# Patient Record
Sex: MALE | Race: BLACK OR AFRICAN AMERICAN | Employment: FULL TIME | URBAN - METROPOLITAN AREA
[De-identification: names, ages, dates, MRNs, and addresses within clinical notes are randomized per-mention and may not be internally consistent; named-entity substitution may affect disease eponyms.]

---

## 2017-06-23 PROBLEM — I25.10 CORONARY ARTERY DISEASE INVOLVING NATIVE HEART WITHOUT ANGINA PECTORIS, UNSPECIFIED VESSEL OR LESION TYPE: Status: ACTIVE | Noted: 2017-06-23

## 2017-07-29 ENCOUNTER — HOSPITAL ENCOUNTER (EMERGENCY)
Facility: HOSPITAL | Age: 57
Discharge: HOME | End: 2017-07-29
Attending: EMERGENCY MEDICINE
Payer: COMMERCIAL

## 2017-07-29 ENCOUNTER — APPOINTMENT (OUTPATIENT)
Dept: GENERAL RADIOLOGY | Facility: HOSPITAL | Age: 57
End: 2017-07-29
Attending: EMERGENCY MEDICINE
Payer: COMMERCIAL

## 2017-07-29 VITALS
HEIGHT: 67 IN | TEMPERATURE: 98 F | OXYGEN SATURATION: 100 % | BODY MASS INDEX: 30.61 KG/M2 | DIASTOLIC BLOOD PRESSURE: 72 MMHG | SYSTOLIC BLOOD PRESSURE: 120 MMHG | RESPIRATION RATE: 18 BRPM | WEIGHT: 195 LBS | HEART RATE: 58 BPM

## 2017-07-29 DIAGNOSIS — R07.9 ACUTE CHEST PAIN: ICD-10-CM

## 2017-07-29 DIAGNOSIS — N28.9 NEPHROPATHY: Primary | ICD-10-CM

## 2017-07-29 LAB
ANION GAP SERPL CALC-SCNC: 7 MMOL/L (ref 0–18)
BASOPHILS # BLD: 0 K/UL (ref 0–0.2)
BASOPHILS NFR BLD: 1 %
BUN SERPL-MCNC: 34 MG/DL (ref 8–20)
BUN/CREAT SERPL: 15.3 (ref 10–20)
CALCIUM SERPL-MCNC: 9.3 MG/DL (ref 8.5–10.5)
CHLORIDE SERPL-SCNC: 107 MMOL/L (ref 95–110)
CO2 SERPL-SCNC: 23 MMOL/L (ref 22–32)
CREAT SERPL-MCNC: 2.22 MG/DL (ref 0.5–1.5)
EOSINOPHIL # BLD: 0.2 K/UL (ref 0–0.7)
EOSINOPHIL NFR BLD: 6 %
ERYTHROCYTE [DISTWIDTH] IN BLOOD BY AUTOMATED COUNT: 13.5 % (ref 11–15)
GLUCOSE SERPL-MCNC: 89 MG/DL (ref 70–99)
HCT VFR BLD AUTO: 41.9 % (ref 41–52)
HGB BLD-MCNC: 14.2 G/DL (ref 13.5–17.5)
LYMPHOCYTES # BLD: 1 K/UL (ref 1–4)
LYMPHOCYTES NFR BLD: 24 %
MCH RBC QN AUTO: 31.6 PG (ref 27–32)
MCHC RBC AUTO-ENTMCNC: 34 G/DL (ref 32–37)
MCV RBC AUTO: 92.9 FL (ref 80–100)
MONOCYTES # BLD: 0.6 K/UL (ref 0–1)
MONOCYTES NFR BLD: 15 %
NEUTROPHILS # BLD AUTO: 2.2 K/UL (ref 1.8–7.7)
NEUTROPHILS NFR BLD: 55 %
OSMOLALITY UR CALC.SUM OF ELEC: 291 MOSM/KG (ref 275–295)
PLATELET # BLD AUTO: 137 K/UL (ref 140–400)
PMV BLD AUTO: 10.6 FL (ref 7.4–10.3)
POTASSIUM SERPL-SCNC: 4.6 MMOL/L (ref 3.3–5.1)
RBC # BLD AUTO: 4.51 M/UL (ref 4.5–5.9)
SODIUM SERPL-SCNC: 137 MMOL/L (ref 136–144)
TROPONIN I SERPL-MCNC: 0.01 NG/ML (ref ?–0.03)
TROPONIN I SERPL-MCNC: 0.01 NG/ML (ref ?–0.03)
WBC # BLD AUTO: 4.1 K/UL (ref 4–11)

## 2017-07-29 PROCEDURE — 84484 ASSAY OF TROPONIN QUANT: CPT | Performed by: EMERGENCY MEDICINE

## 2017-07-29 PROCEDURE — 93010 ELECTROCARDIOGRAM REPORT: CPT | Performed by: EMERGENCY MEDICINE

## 2017-07-29 PROCEDURE — 80048 BASIC METABOLIC PNL TOTAL CA: CPT | Performed by: EMERGENCY MEDICINE

## 2017-07-29 PROCEDURE — 36415 COLL VENOUS BLD VENIPUNCTURE: CPT

## 2017-07-29 PROCEDURE — 93005 ELECTROCARDIOGRAM TRACING: CPT

## 2017-07-29 PROCEDURE — 99285 EMERGENCY DEPT VISIT HI MDM: CPT

## 2017-07-29 PROCEDURE — 71020 XR CHEST PA + LAT CHEST (CPT=71020): CPT | Performed by: EMERGENCY MEDICINE

## 2017-07-29 PROCEDURE — 85025 COMPLETE CBC W/AUTO DIFF WBC: CPT | Performed by: EMERGENCY MEDICINE

## 2017-07-30 NOTE — ED PROVIDER NOTES
Patient Seen in: [facility] Emergency Department    History   Patient presents with:  Chest Pain Angina (cardiovascular)      HPI    The patient presents to the ED complaining of right-sided chest pain that started this morning after lifting some chills and fever. HENT: Negative for congestion and sore throat. Eyes: Negative for pain and visual disturbance. Respiratory: Negative for cough and shortness of breath. Cardiovascular: Positive for chest pain. Negative for palpitations.    Viki Gar dry. No rash noted. Nursing note and vitals reviewed.       ED Course        Labs Reviewed   BASIC METABOLIC PANEL (8) - Abnormal; Notable for the following:        Result Value    BUN 34 (*)     Creatinine 2.22 (*)     GFR, Non- 31 (*) Ox: 100%, Room air, Normal     Monitor Interpretation:   normal sinus rhythm, sinus bradycardia    Differential Diagnosis/ Diagnostic Considerations: Atypical chest pain, chest wall pain, ACS    Medical Record Review: I personally reviewed available prior pain    Disposition:  Discharge    Follow-up:  Renny Williamson MD  [facility]  [phone]    Schedule an appointment as soon as possible for a visit in 2 days      Your cardiologist    Schedule an appointment as soon as poss

## 2017-08-01 PROCEDURE — 80061 LIPID PANEL: CPT | Performed by: INTERNAL MEDICINE

## 2017-08-01 PROCEDURE — 36415 COLL VENOUS BLD VENIPUNCTURE: CPT | Performed by: INTERNAL MEDICINE

## 2017-08-01 PROCEDURE — 80076 HEPATIC FUNCTION PANEL: CPT | Performed by: INTERNAL MEDICINE

## 2018-10-24 PROBLEM — N18.30 ACUTE RENAL FAILURE SUPERIMPOSED ON STAGE 3 CHRONIC KIDNEY DISEASE (HCC): Status: ACTIVE | Noted: 2018-10-24

## 2018-10-24 PROBLEM — N17.9 ACUTE RENAL FAILURE SUPERIMPOSED ON STAGE 3 CHRONIC KIDNEY DISEASE (HCC): Status: ACTIVE | Noted: 2018-10-24

## 2019-02-12 PROBLEM — N18.4 CKD (CHRONIC KIDNEY DISEASE) STAGE 4, GFR 15-29 ML/MIN (HCC): Status: ACTIVE | Noted: 2019-02-12

## 2019-04-17 ENCOUNTER — TELEPHONE (OUTPATIENT)
Dept: INTERNAL MEDICINE CLINIC | Facility: CLINIC | Age: 59
End: 2019-04-17

## 2019-04-17 RX ORDER — MECLIZINE HCL 12.5 MG/1
12.5 TABLET ORAL 3 TIMES DAILY PRN
Qty: 30 TABLET | Refills: 5 | Status: SHIPPED | OUTPATIENT
Start: 2019-04-17 | End: 2019-12-18

## 2019-04-17 NOTE — TELEPHONE ENCOUNTER
Pt requesting RX renewal for:  Meclizine  Pt uses [facility] as pharmacy  Pt is out of medication  Tasked to [employer]

## 2019-05-01 RX ORDER — ALPRAZOLAM 0.25 MG/1
TABLET ORAL
Qty: 30 TABLET | Refills: 1 | Status: SHIPPED
Start: 2019-05-01 | End: 2020-02-20

## 2019-06-19 ENCOUNTER — TELEPHONE (OUTPATIENT)
Dept: INTERNAL MEDICINE CLINIC | Facility: CLINIC | Age: 59
End: 2019-06-19

## 2019-06-20 RX ORDER — SPIRONOLACTONE 25 MG/1
TABLET ORAL
Qty: 90 TABLET | Refills: 3 | Status: SHIPPED | OUTPATIENT
Start: 2019-06-20 | End: 2020-09-08

## 2019-06-20 NOTE — TELEPHONE ENCOUNTER
To  - pls review spironolactone with renal CrCl (chronic kidney disease stage 4, gfr 15-29 ml/min (hcc)   although pt K+ is WNL      Component      Latest Ref Rng & Units 8/7/2018 2/6/2018 7/29/2017 11/7/2016   CREATININE      0.70 - 1.30 mg/dL 2.74 (H

## 2019-06-20 NOTE — TELEPHONE ENCOUNTER
Labs drawn by nephrologist in February revealed creatinine of 2.2--this has improved.   Therefore okay to continue same medications including spironolactone

## 2019-07-01 ENCOUNTER — TELEPHONE (OUTPATIENT)
Dept: INTERNAL MEDICINE CLINIC | Facility: CLINIC | Age: 59
End: 2019-07-01

## 2019-07-01 RX ORDER — TADALAFIL 20 MG/1
20 TABLET ORAL AS NEEDED
Qty: 8 TABLET | Refills: 0 | Status: SHIPPED | OUTPATIENT
Start: 2019-07-01 | End: 2020-06-25

## 2019-07-01 NOTE — TELEPHONE ENCOUNTER
Pt requesting refill for:   Tadalafil  Pt is out of medication  Pt will use [facility] as pharmacy  Tasked to [employer]

## 2019-08-07 ENCOUNTER — OFFICE VISIT (OUTPATIENT)
Dept: INTERNAL MEDICINE CLINIC | Facility: CLINIC | Age: 59
End: 2019-08-07
Payer: COMMERCIAL

## 2019-08-07 ENCOUNTER — APPOINTMENT (OUTPATIENT)
Dept: LAB | Age: 59
End: 2019-08-07
Attending: INTERNAL MEDICINE
Payer: COMMERCIAL

## 2019-08-07 VITALS
HEIGHT: 67 IN | BODY MASS INDEX: 32.41 KG/M2 | WEIGHT: 206.5 LBS | DIASTOLIC BLOOD PRESSURE: 70 MMHG | HEART RATE: 55 BPM | TEMPERATURE: 98 F | OXYGEN SATURATION: 98 % | SYSTOLIC BLOOD PRESSURE: 110 MMHG

## 2019-08-07 DIAGNOSIS — M25.562 LEFT KNEE PAIN, UNSPECIFIED CHRONICITY: ICD-10-CM

## 2019-08-07 DIAGNOSIS — E87.5 HYPERKALEMIA: ICD-10-CM

## 2019-08-07 DIAGNOSIS — I25.10 CORONARY ARTERY DISEASE INVOLVING NATIVE HEART WITHOUT ANGINA PECTORIS, UNSPECIFIED VESSEL OR LESION TYPE: ICD-10-CM

## 2019-08-07 DIAGNOSIS — I10 ESSENTIAL HYPERTENSION: Primary | ICD-10-CM

## 2019-08-07 DIAGNOSIS — N18.4 CKD (CHRONIC KIDNEY DISEASE) STAGE 4, GFR 15-29 ML/MIN (HCC): ICD-10-CM

## 2019-08-07 DIAGNOSIS — M79.605 LEFT LEG PAIN: ICD-10-CM

## 2019-08-07 DIAGNOSIS — N28.9 NEPHROPATHY: ICD-10-CM

## 2019-08-07 LAB
ANION GAP SERPL CALC-SCNC: 7 MMOL/L (ref 0–18)
BUN BLD-MCNC: 53 MG/DL (ref 7–18)
BUN/CREAT SERPL: 22.4 (ref 10–20)
CALCIUM BLD-MCNC: 10 MG/DL (ref 8.5–10.1)
CHLORIDE SERPL-SCNC: 111 MMOL/L (ref 98–112)
CO2 SERPL-SCNC: 24 MMOL/L (ref 21–32)
CREAT BLD-MCNC: 2.37 MG/DL (ref 0.7–1.3)
GLUCOSE BLD-MCNC: 71 MG/DL (ref 70–99)
OSMOLALITY SERPL CALC.SUM OF ELEC: 307 MOSM/KG (ref 275–295)
PATIENT FASTING: NO
POTASSIUM SERPL-SCNC: 4.8 MMOL/L (ref 3.5–5.1)
SODIUM SERPL-SCNC: 142 MMOL/L (ref 136–145)

## 2019-08-07 PROCEDURE — 99213 OFFICE O/P EST LOW 20 MIN: CPT | Performed by: INTERNAL MEDICINE

## 2019-08-07 PROCEDURE — 80048 BASIC METABOLIC PNL TOTAL CA: CPT

## 2019-08-07 PROCEDURE — 36415 COLL VENOUS BLD VENIPUNCTURE: CPT

## 2019-08-07 NOTE — PROGRESS NOTES
HPI:    Patient ID: Abril Radford is a 63year old male. Patient presents for follow-up. He is noting worsening left knee and left calf pain. He denies any specific injury or trauma. At times, he feels as if it may give out.   The pain is usually worse Take 1 tablet (100 mg total) by mouth daily. Disp: 90 tablet Rfl: 3   Clobetasol Propionate 0.05 % External Ointment Apply twice daily Disp: 60 g Rfl: 3   metoprolol tartrate 12.5 mg Oral Tab Take 12.5 mg by mouth 2x Daily(Beta Blocker).  Disp:  Rfl:    ome recheck BMP today--if potassium remains elevated, patient may need further medication adjustment. He does have appointment with nephrology next month.     Patient is having left knee and leg pain--appears to be more muscular in nature and may have to do wi

## 2019-08-09 ENCOUNTER — TELEPHONE (OUTPATIENT)
Dept: INTERNAL MEDICINE CLINIC | Facility: CLINIC | Age: 59
End: 2019-08-09

## 2019-08-09 NOTE — TELEPHONE ENCOUNTER
----- Message from Jace Ivan MD sent at 8/9/2019  7:38 AM CDT -----  Potassium is now normal at 4.8. Creatinine shows improvement at 2.37. This is now back to baseline. Therefore no interventions needed. Continue same medications.

## 2019-09-15 ENCOUNTER — TELEPHONE (OUTPATIENT)
Dept: INTERNAL MEDICINE CLINIC | Facility: CLINIC | Age: 59
End: 2019-09-15

## 2019-09-16 NOTE — TELEPHONE ENCOUNTER
Refill requests received for losartan and amlodipine-atorvastatin.      High Medication Interaction Alert:  Drug-Drug: spironolactone and losartan   The risk of hyperkalemia may be increased when potassium-sparing diuretics are co-administered with angioten

## 2019-09-17 RX ORDER — AMLODIPINE BESYLATE AND ATORVASTATIN CALCIUM 10; 80 MG/1; MG/1
TABLET, FILM COATED ORAL
Qty: 90 TABLET | Refills: 3 | Status: SHIPPED | OUTPATIENT
Start: 2019-09-17 | End: 2019-09-23 | Stop reason: RX

## 2019-09-17 RX ORDER — LOSARTAN POTASSIUM 100 MG/1
TABLET ORAL
Qty: 90 TABLET | Refills: 3 | Status: SHIPPED | OUTPATIENT
Start: 2019-09-17 | End: 2020-09-10

## 2019-09-23 RX ORDER — AMLODIPINE BESYLATE 10 MG/1
10 TABLET ORAL DAILY
Qty: 90 TABLET | Refills: 3 | Status: SHIPPED | OUTPATIENT
Start: 2019-09-23 | End: 2020-09-18

## 2019-09-23 RX ORDER — ATORVASTATIN CALCIUM 80 MG/1
80 TABLET, FILM COATED ORAL DAILY
Qty: 90 TABLET | Refills: 3 | Status: SHIPPED | OUTPATIENT
Start: 2019-09-23 | End: 2020-09-18

## 2019-09-23 NOTE — TELEPHONE ENCOUNTER
Per 2/12/19   Patient's cardiac status is stable. He is having no angina symptoms. He is exercising regularly and feeling better Because of this. Continue with medical management.     Requested Prescriptions     Signed Prescriptions Disp Refills   • AMLO

## 2019-09-24 NOTE — TELEPHONE ENCOUNTER
[facility] Amlod[facility]ine-At[facility]vast, medication is on backorder  Please consider sending as 2 separate scripts, Amlodipine 10 mg and then another script for Atorvastatin 80 mg  Placed in purple folder   Tasked to rx

## 2019-09-27 RX ORDER — FEBUXOSTAT 80 MG/1
1 TABLET, FILM COATED ORAL
Qty: 90 TABLET | Refills: 3 | Status: SHIPPED | OUTPATIENT
Start: 2019-09-27 | End: 2020-01-07 | Stop reason: DRUGHIGH

## 2019-09-27 NOTE — TELEPHONE ENCOUNTER
Called to request Uloric refill  No refills were avail at pharmacy   -  takes half of an 80 mg tablet daily  Please send prescriptions to [facility]

## 2019-10-11 ENCOUNTER — OFFICE VISIT (OUTPATIENT)
Dept: INTERNAL MEDICINE CLINIC | Facility: CLINIC | Age: 59
End: 2019-10-11
Payer: COMMERCIAL

## 2019-10-11 VITALS
TEMPERATURE: 99 F | SYSTOLIC BLOOD PRESSURE: 130 MMHG | HEIGHT: 67 IN | OXYGEN SATURATION: 98 % | WEIGHT: 206.25 LBS | HEART RATE: 60 BPM | DIASTOLIC BLOOD PRESSURE: 70 MMHG | BODY MASS INDEX: 32.37 KG/M2

## 2019-10-11 DIAGNOSIS — R42 VERTIGO: Primary | ICD-10-CM

## 2019-10-11 DIAGNOSIS — I25.10 CORONARY ARTERY DISEASE INVOLVING NATIVE CORONARY ARTERY OF NATIVE HEART WITHOUT ANGINA PECTORIS: ICD-10-CM

## 2019-10-11 DIAGNOSIS — N28.9 NEPHROPATHY: ICD-10-CM

## 2019-10-11 DIAGNOSIS — I10 ESSENTIAL HYPERTENSION: ICD-10-CM

## 2019-10-11 DIAGNOSIS — E87.5 HYPERKALEMIA: ICD-10-CM

## 2019-10-11 PROCEDURE — 90471 IMMUNIZATION ADMIN: CPT | Performed by: INTERNAL MEDICINE

## 2019-10-11 PROCEDURE — 90686 IIV4 VACC NO PRSV 0.5 ML IM: CPT | Performed by: INTERNAL MEDICINE

## 2019-10-11 PROCEDURE — 99214 OFFICE O/P EST MOD 30 MIN: CPT | Performed by: INTERNAL MEDICINE

## 2019-10-11 NOTE — PROGRESS NOTES
HPI:    Patient ID: Katherine Vega is a 63year old male. Patient presented to [facility] emergency room on October 4 with complaints of dizziness. This was thought to be secondary to his symptoms of previous labyrinthitis/benign positional vertigo.   He was st 12.5 MG Oral Tab Take 1 tablet (12.5 mg total) by mouth 3 (three) times daily as needed.  Disp: 30 tablet Rfl: 5   Clobetasol Propionate 0.05 % External Ointment Apply twice daily Disp: 60 g Rfl: 3   omega-3 fatty acids (FISH OIL) 1000 MG Oral Cap Take 1,00 nephropathy with proteinuria. Therefore will continue same losartan dose.   However will try to decrease spironolactone from patient's current to 25 mg daily down to 12.5 mg daily  We will recheck potassium level again in 2 weeks    Renal function has been

## 2019-10-30 ENCOUNTER — LAB ENCOUNTER (OUTPATIENT)
Dept: LAB | Age: 59
End: 2019-10-30
Attending: INTERNAL MEDICINE
Payer: COMMERCIAL

## 2019-10-30 DIAGNOSIS — I10 ESSENTIAL HYPERTENSION: ICD-10-CM

## 2019-10-30 DIAGNOSIS — N18.30 ACUTE RENAL FAILURE WITH ACUTE TUBULAR NECROSIS SUPERIMPOSED ON STAGE 3 CHRONIC KIDNEY DISEASE (HCC): ICD-10-CM

## 2019-10-30 DIAGNOSIS — N17.0 ACUTE RENAL FAILURE WITH ACUTE TUBULAR NECROSIS SUPERIMPOSED ON STAGE 3 CHRONIC KIDNEY DISEASE (HCC): ICD-10-CM

## 2019-10-30 DIAGNOSIS — E87.5 HYPERKALEMIA: ICD-10-CM

## 2019-10-30 DIAGNOSIS — I25.10 CORONARY ARTERY DISEASE INVOLVING NATIVE HEART WITHOUT ANGINA PECTORIS, UNSPECIFIED VESSEL OR LESION TYPE: ICD-10-CM

## 2019-10-30 PROCEDURE — 80061 LIPID PANEL: CPT

## 2019-10-30 PROCEDURE — 36415 COLL VENOUS BLD VENIPUNCTURE: CPT

## 2019-10-30 PROCEDURE — 80048 BASIC METABOLIC PNL TOTAL CA: CPT

## 2019-10-30 PROCEDURE — 80076 HEPATIC FUNCTION PANEL: CPT

## 2019-10-31 ENCOUNTER — TELEPHONE (OUTPATIENT)
Dept: INTERNAL MEDICINE CLINIC | Facility: CLINIC | Age: 59
End: 2019-10-31

## 2019-10-31 NOTE — TELEPHONE ENCOUNTER
----- Message from Gale Squires MD sent at 10/31/2019  4:48 PM CDT -----  Renal function shows improvement with BUN 34 and creatinine of 2.08. Potassium is now also normal at 4.3.   Therefore continue same medications, including the previously reduced

## 2019-11-01 NOTE — TELEPHONE ENCOUNTER
Pt returning call for lab results (computers were down when he called back) please call home [phone] ok to leave voicemail

## 2019-11-11 ENCOUNTER — TELEPHONE (OUTPATIENT)
Dept: INTERNAL MEDICINE CLINIC | Facility: CLINIC | Age: 59
End: 2019-11-11

## 2019-11-13 NOTE — TELEPHONE ENCOUNTER
Please advise- patient called back , stating DR. ELLISON was prescriber - refill pending- to DR. ELLISON .  Patient wants to know when done

## 2019-12-12 ENCOUNTER — TELEPHONE (OUTPATIENT)
Dept: INTERNAL MEDICINE CLINIC | Facility: CLINIC | Age: 59
End: 2019-12-12

## 2019-12-12 NOTE — TELEPHONE ENCOUNTER
Blood pressure is too high. Patient is currently taking metoprolol 12.5 mg p.o. twice daily  He should increase this to 25 mg p.o. twice daily. Continue with his other antihypertensives.   Patient needs to monitor blood pressure and pulse while on this do

## 2019-12-12 NOTE — TELEPHONE ENCOUNTER
I spoke to Cite Bhavesh Daugherty and relayed Dr Selwyn Mujica message to him. He verbalized understanding. He actually has 25 mg tablets that he normally breaks in half, so he will just take the full tablet twice daily.   He scheduled an appt for next Wednesday at 12:20pm.

## 2019-12-12 NOTE — TELEPHONE ENCOUNTER
Pt asked that Dr Cathy Harvey please call him  Has question about his blood pressure  180/119, pt is concerned this is high  Tasked to nursing

## 2019-12-12 NOTE — TELEPHONE ENCOUNTER
To Dr. Elinor Bennett - see below - BP below taken 20 minutes ago. Yesterday 169/110. Pt had headache yesterday, resolved today. Denies diaphoresis, SOB.      Does c/o pain left shoulder - pt has hx of pinched nerves, seeing chiropractor, saw him yesterday, William Mcpherson

## 2019-12-18 ENCOUNTER — OFFICE VISIT (OUTPATIENT)
Dept: INTERNAL MEDICINE CLINIC | Facility: CLINIC | Age: 59
End: 2019-12-18
Payer: COMMERCIAL

## 2019-12-18 ENCOUNTER — APPOINTMENT (OUTPATIENT)
Dept: LAB | Age: 59
End: 2019-12-18
Attending: INTERNAL MEDICINE
Payer: COMMERCIAL

## 2019-12-18 VITALS
OXYGEN SATURATION: 98 % | SYSTOLIC BLOOD PRESSURE: 126 MMHG | DIASTOLIC BLOOD PRESSURE: 86 MMHG | TEMPERATURE: 98 F | HEART RATE: 56 BPM | BODY MASS INDEX: 32.67 KG/M2 | WEIGHT: 208.19 LBS | HEIGHT: 67 IN

## 2019-12-18 DIAGNOSIS — H83.2X9 VESTIBULAR DISEQUILIBRIUM, UNSPECIFIED LATERALITY: ICD-10-CM

## 2019-12-18 DIAGNOSIS — E87.5 HYPERKALEMIA: ICD-10-CM

## 2019-12-18 DIAGNOSIS — N18.4 CKD (CHRONIC KIDNEY DISEASE) STAGE 4, GFR 15-29 ML/MIN (HCC): ICD-10-CM

## 2019-12-18 DIAGNOSIS — I25.10 CORONARY ARTERY DISEASE INVOLVING NATIVE HEART WITHOUT ANGINA PECTORIS, UNSPECIFIED VESSEL OR LESION TYPE: ICD-10-CM

## 2019-12-18 DIAGNOSIS — I10 ESSENTIAL HYPERTENSION: Primary | ICD-10-CM

## 2019-12-18 PROCEDURE — 99213 OFFICE O/P EST LOW 20 MIN: CPT | Performed by: INTERNAL MEDICINE

## 2019-12-18 PROCEDURE — 80048 BASIC METABOLIC PNL TOTAL CA: CPT

## 2019-12-18 PROCEDURE — 36415 COLL VENOUS BLD VENIPUNCTURE: CPT

## 2019-12-18 RX ORDER — MECLIZINE HYDROCHLORIDE 25 MG/1
25 TABLET ORAL 3 TIMES DAILY PRN
Qty: 90 TABLET | Refills: 3 | Status: SHIPPED | OUTPATIENT
Start: 2019-12-18 | End: 2021-03-22

## 2019-12-18 NOTE — PROGRESS NOTES
HPI:    Patient ID: Betsey Linedr is a 63year old male. Presents for follow-up. Continues to have intermittent dizziness with certain position changes  Takes meclizine and this does help.     Has been monitoring his home blood pressure and noting readin Oral Tab TAKE 1 TABLET BY MOUTH TWICE A DAY AS NEEDED 30 tablet 1   • Clobetasol Propionate 0.05 % External Ointment Apply twice daily 60 g 3   • omega-3 fatty acids (FISH OIL) 1000 MG Oral Cap Take 1,000 mg by mouth daily.      • aspirin 81 MG Oral Tab [facility] Basic Metabolic Panel (8) [E]      Meds This Visit:  Requested Prescriptions     Signed Prescriptions Disp Refills   • Meclizine HCl 25 MG Oral Tab 90 tablet 3     Sig: Take 1 tablet (25 mg total) by mouth 3 (three) times daily as needed.        Imaging & R

## 2019-12-19 ENCOUNTER — TELEPHONE (OUTPATIENT)
Dept: INTERNAL MEDICINE CLINIC | Facility: CLINIC | Age: 59
End: 2019-12-19

## 2019-12-19 NOTE — TELEPHONE ENCOUNTER
----- Message from Tata Balderrama MD sent at 12/19/2019  4:52 PM CST -----  CMP shows improved renal function with creatinine of 1.94.   Potassium also remains normal at 5.1  Continue same medications

## 2020-01-02 ENCOUNTER — HOSPITAL ENCOUNTER (EMERGENCY)
Facility: HOSPITAL | Age: 60
Discharge: HOME | End: 2020-01-02
Attending: EMERGENCY MEDICINE
Payer: COMMERCIAL

## 2020-01-02 ENCOUNTER — TELEPHONE (OUTPATIENT)
Dept: INTERNAL MEDICINE CLINIC | Facility: CLINIC | Age: 60
End: 2020-01-02

## 2020-01-02 VITALS
DIASTOLIC BLOOD PRESSURE: 78 MMHG | OXYGEN SATURATION: 96 % | SYSTOLIC BLOOD PRESSURE: 122 MMHG | WEIGHT: 205 LBS | RESPIRATION RATE: 18 BRPM | HEART RATE: 51 BPM | TEMPERATURE: 99 F | HEIGHT: 67 IN | BODY MASS INDEX: 32.18 KG/M2

## 2020-01-02 DIAGNOSIS — R51.9 HEADACHE DISORDER: ICD-10-CM

## 2020-01-02 DIAGNOSIS — I10 HYPERTENSION, UNSPECIFIED TYPE: Primary | ICD-10-CM

## 2020-01-02 LAB
ANION GAP SERPL CALC-SCNC: 3 MMOL/L (ref 0–18)
BASOPHILS # BLD AUTO: 0.03 X10(3) UL (ref 0–0.2)
BASOPHILS NFR BLD AUTO: 0.6 %
BUN BLD-MCNC: 31 MG/DL (ref 7–18)
BUN/CREAT SERPL: 14.4 (ref 10–20)
CALCIUM BLD-MCNC: 9.1 MG/DL (ref 8.5–10.1)
CHLORIDE SERPL-SCNC: 113 MMOL/L (ref 98–112)
CO2 SERPL-SCNC: 24 MMOL/L (ref 21–32)
CREAT BLD-MCNC: 2.15 MG/DL (ref 0.7–1.3)
DEPRECATED RDW RBC AUTO: 45.3 FL (ref 35.1–46.3)
EOSINOPHIL # BLD AUTO: 0.2 X10(3) UL (ref 0–0.7)
EOSINOPHIL NFR BLD AUTO: 3.8 %
ERYTHROCYTE [DISTWIDTH] IN BLOOD BY AUTOMATED COUNT: 13.2 % (ref 11–15)
GLUCOSE BLD-MCNC: 89 MG/DL (ref 70–99)
HCT VFR BLD AUTO: 45.4 % (ref 39–53)
HGB BLD-MCNC: 14.9 G/DL (ref 13–17.5)
IMM GRANULOCYTES # BLD AUTO: 0.03 X10(3) UL (ref 0–1)
IMM GRANULOCYTES NFR BLD: 0.6 %
LYMPHOCYTES # BLD AUTO: 1.38 X10(3) UL (ref 1–4)
LYMPHOCYTES NFR BLD AUTO: 26.4 %
MCH RBC QN AUTO: 30.9 PG (ref 26–34)
MCHC RBC AUTO-ENTMCNC: 32.8 G/DL (ref 31–37)
MCV RBC AUTO: 94.2 FL (ref 80–100)
MONOCYTES # BLD AUTO: 0.6 X10(3) UL (ref 0.1–1)
MONOCYTES NFR BLD AUTO: 11.5 %
NEUTROPHILS # BLD AUTO: 2.98 X10 (3) UL (ref 1.5–7.7)
NEUTROPHILS # BLD AUTO: 2.98 X10(3) UL (ref 1.5–7.7)
NEUTROPHILS NFR BLD AUTO: 57.1 %
OSMOLALITY SERPL CALC.SUM OF ELEC: 296 MOSM/KG (ref 275–295)
PLATELET # BLD AUTO: 156 10(3)UL (ref 150–450)
POTASSIUM SERPL-SCNC: 4.4 MMOL/L (ref 3.5–5.1)
RBC # BLD AUTO: 4.82 X10(6)UL (ref 4.3–5.7)
SODIUM SERPL-SCNC: 140 MMOL/L (ref 136–145)
TROPONIN I SERPL-MCNC: <0.045 NG/ML (ref ?–0.04)
WBC # BLD AUTO: 5.2 X10(3) UL (ref 4–11)

## 2020-01-02 PROCEDURE — 80048 BASIC METABOLIC PNL TOTAL CA: CPT | Performed by: EMERGENCY MEDICINE

## 2020-01-02 PROCEDURE — 96375 TX/PRO/DX INJ NEW DRUG ADDON: CPT

## 2020-01-02 PROCEDURE — 99284 EMERGENCY DEPT VISIT MOD MDM: CPT

## 2020-01-02 PROCEDURE — 96374 THER/PROPH/DIAG INJ IV PUSH: CPT

## 2020-01-02 PROCEDURE — 93005 ELECTROCARDIOGRAM TRACING: CPT

## 2020-01-02 PROCEDURE — 96361 HYDRATE IV INFUSION ADD-ON: CPT

## 2020-01-02 PROCEDURE — 93010 ELECTROCARDIOGRAM REPORT: CPT | Performed by: EMERGENCY MEDICINE

## 2020-01-02 PROCEDURE — 84484 ASSAY OF TROPONIN QUANT: CPT | Performed by: EMERGENCY MEDICINE

## 2020-01-02 PROCEDURE — 85025 COMPLETE CBC W/AUTO DIFF WBC: CPT | Performed by: EMERGENCY MEDICINE

## 2020-01-02 RX ORDER — DIPHENHYDRAMINE HYDROCHLORIDE 50 MG/ML
25 INJECTION INTRAMUSCULAR; INTRAVENOUS ONCE
Status: DISCONTINUED | OUTPATIENT
Start: 2020-01-02 | End: 2020-01-02

## 2020-01-02 RX ORDER — METOCLOPRAMIDE HYDROCHLORIDE 5 MG/ML
10 INJECTION INTRAMUSCULAR; INTRAVENOUS ONCE
Status: DISCONTINUED | OUTPATIENT
Start: 2020-01-02 | End: 2020-01-02

## 2020-01-02 RX ORDER — ACETAMINOPHEN 325 MG/1
650 TABLET ORAL ONCE
Status: COMPLETED | OUTPATIENT
Start: 2020-01-02 | End: 2020-01-02

## 2020-01-02 NOTE — ED INITIAL ASSESSMENT (HPI)
Pt reports high b/p reading all week.  Pt reports his bp was 210/118 this am. Pt denies cp and reports an occipital HA that radiates in his left eye

## 2020-01-02 NOTE — ED NOTES
Pt refused IV medication for headache. MD notified. Pt requesting tylenol po. Ok per MD verbal order. Pt states headache \"mild\".

## 2020-01-02 NOTE — ED PROVIDER NOTES
Patient Seen in: [facility] Emergency Department      History   Patient presents with:  Hypertension    Stated Complaint:     HPI    31-year-old male with history of CAD, hypertension, recently increased on his metoprolol and compliant with his me nausea and vomiting. Genitourinary: Negative for dysuria, flank pain and frequency. Musculoskeletal: Negative for back pain. Skin: Negative for rash. Neurological: Positive for headaches. Negative for weakness and light-headedness.    All other syst b/l UEs and LEs, normal sensation in all extremities, normal finger to nose b/l, normal gait, no facial asymmetry, normal speech             ED Course     EKG    Rate, intervals and axes as noted on EKG Report.   Rate: 46  Rhythm: Sinus Rhythm  Reading: abn MCV 94.2 80.0 - 100.0 fL    MCH 30.9 26.0 - 34.0 pg    MCHC 32.8 31.0 - 37.0 g/dL    RDW-SD 45.3 35.1 - 46.3 fL    RDW 13.2 11.0 - 15.0 %    .0 150.0 - 450.0 10(3)uL    Neutrophil Absolute Prelim 2.98 1.50 - 7.70 x10 (3) uL    Neutrophil Absolute 2. understanding and agrees to d/c instructions    EMERGENCY DEPARTMENT MEDICAL DECISION MAKING:  After obtaining the patient's history, performing the physical exam and reviewing the diagnostics, multiple initial diagnoses were considered based on the presen

## 2020-01-02 NOTE — TELEPHONE ENCOUNTER
Please call pt at home  BP is high today 210/127, pt medications have been adjusted recently  171/117 yesterday  Tasked to nursing

## 2020-01-02 NOTE — TELEPHONE ENCOUNTER
To Dr. Praneeth Perry - see below - pt states BP has been running high the last week or so. Headaches which are somewhat relieved OTC fever reducer. Denies/ dizziness/SOB. Pt states Left arm is sore, \"like I have been lifting wgts and my arm is sore\".   Onset:

## 2020-01-02 NOTE — TELEPHONE ENCOUNTER
Spoke to patient and relayed MD message. Patient verbalized understanding and agrees with plan. Pt stated he will drive himself to [facility].

## 2020-01-07 ENCOUNTER — OFFICE VISIT (OUTPATIENT)
Dept: INTERNAL MEDICINE CLINIC | Facility: CLINIC | Age: 60
End: 2020-01-07
Payer: COMMERCIAL

## 2020-01-07 VITALS
HEART RATE: 51 BPM | WEIGHT: 210 LBS | SYSTOLIC BLOOD PRESSURE: 124 MMHG | DIASTOLIC BLOOD PRESSURE: 84 MMHG | OXYGEN SATURATION: 99 % | BODY MASS INDEX: 32.96 KG/M2 | HEIGHT: 67 IN | RESPIRATION RATE: 16 BRPM | TEMPERATURE: 98 F

## 2020-01-07 DIAGNOSIS — N18.4 CKD (CHRONIC KIDNEY DISEASE) STAGE 4, GFR 15-29 ML/MIN (HCC): ICD-10-CM

## 2020-01-07 DIAGNOSIS — I10 ESSENTIAL HYPERTENSION: ICD-10-CM

## 2020-01-07 DIAGNOSIS — R51.9 HEADACHE DISORDER: Primary | ICD-10-CM

## 2020-01-07 DIAGNOSIS — I25.10 CORONARY ARTERY DISEASE INVOLVING NATIVE HEART WITHOUT ANGINA PECTORIS, UNSPECIFIED VESSEL OR LESION TYPE: ICD-10-CM

## 2020-01-07 PROCEDURE — 99213 OFFICE O/P EST LOW 20 MIN: CPT | Performed by: INTERNAL MEDICINE

## 2020-01-07 RX ORDER — CLOPIDOGREL BISULFATE 75 MG/1
1 TABLET ORAL DAILY
COMMUNITY
Start: 2016-12-07 | End: 2021-12-21

## 2020-01-07 RX ORDER — ACETAMINOPHEN 500 MG
1000 TABLET ORAL EVERY 6 HOURS PRN
COMMUNITY
End: 2022-02-01

## 2020-01-07 RX ORDER — FEBUXOSTAT 40 MG/1
1 TABLET, FILM COATED ORAL DAILY
COMMUNITY
Start: 2019-02-13 | End: 2020-10-01

## 2020-01-07 NOTE — PROGRESS NOTES
HPI:    Patient ID: Jackson Luna is a 63year old male. Patient presents with complaints of recurrent headaches that initially started approximately 2 to 3 weeks ago. Patient has been taking Tylenol migraine and notes significant improvement with this. daily as needed. 90 tablet 3   • metoprolol Tartrate 25 MG Oral Tab Take 0.5 tablets (12.5 mg total) by mouth 2 (two) times daily. (Patient taking differently: Take 25 mg by mouth 2 (two) times daily.   ) 180 tablet 3   • atorvastatin 80 MG Oral Tab Take 1 distress. He has no wheezes. He has no rales. Musculoskeletal:         General: No edema. Lymphadenopathy:     He has no cervical adenopathy. Neurological: He is alert and oriented to person, place, and time. He has normal reflexes.  No cranial nerve

## 2020-01-15 ENCOUNTER — TELEPHONE (OUTPATIENT)
Dept: INTERNAL MEDICINE CLINIC | Facility: CLINIC | Age: 60
End: 2020-01-15

## 2020-01-15 ENCOUNTER — HOSPITAL ENCOUNTER (OUTPATIENT)
Dept: MRI IMAGING | Facility: HOSPITAL | Age: 60
Discharge: HOME | End: 2020-01-15
Attending: INTERNAL MEDICINE
Payer: COMMERCIAL

## 2020-01-15 DIAGNOSIS — R51.9 HEADACHE DISORDER: ICD-10-CM

## 2020-01-15 PROCEDURE — 70551 MRI BRAIN STEM W/O DYE: CPT | Performed by: INTERNAL MEDICINE

## 2020-01-22 ENCOUNTER — TELEPHONE (OUTPATIENT)
Dept: INTERNAL MEDICINE CLINIC | Facility: CLINIC | Age: 60
End: 2020-01-22

## 2020-01-22 NOTE — TELEPHONE ENCOUNTER
[facility] [place] [facility] RX for new dosage for:  Metoprolol  \"pt states dose has been increased to 1 tablet twice daily\"  Form placed in purple folder  Tasked to [employer]

## 2020-01-24 NOTE — TELEPHONE ENCOUNTER
Pt confirmed dose. He is taking metoprolol 25mg po BID. Requests Rx be sent to [facility] in [place].     Refill request is for a maintenance medication and has met the criteria specified in the Ambulatory Medication Refill Standing Order for [facility]

## 2020-02-20 RX ORDER — ALPRAZOLAM 0.25 MG/1
0.25 TABLET ORAL 2 TIMES DAILY PRN
Qty: 30 TABLET | Refills: 1 | Status: SHIPPED | OUTPATIENT
Start: 2020-02-20 | End: 2020-11-21

## 2020-02-20 NOTE — TELEPHONE ENCOUNTER
To Dr. Alicia Began----    The above refill request is for a controlled substance. Please review pended medication order.

## 2020-03-20 ENCOUNTER — PATIENT MESSAGE (OUTPATIENT)
Dept: INTERNAL MEDICINE CLINIC | Facility: CLINIC | Age: 60
End: 2020-03-20

## 2020-03-20 NOTE — TELEPHONE ENCOUNTER
From: Alexis Romero  To:  Claudine Leventhal, MD  Sent: 3/20/2020 2:11 PM CDT  Subject: Other    Later today the governor is going to issue a shelter in place order for all non essential employees my job has been deemed essential so we will be expected to work

## 2020-03-22 ENCOUNTER — PATIENT MESSAGE (OUTPATIENT)
Dept: INTERNAL MEDICINE CLINIC | Facility: CLINIC | Age: 60
End: 2020-03-22

## 2020-04-16 ENCOUNTER — TELEPHONE (OUTPATIENT)
Dept: INTERNAL MEDICINE CLINIC | Facility: CLINIC | Age: 60
End: 2020-04-16

## 2020-04-16 ENCOUNTER — OFFICE VISIT (OUTPATIENT)
Dept: INTERNAL MEDICINE CLINIC | Facility: CLINIC | Age: 60
End: 2020-04-16
Payer: COMMERCIAL

## 2020-04-16 VITALS
HEART RATE: 62 BPM | HEIGHT: 67 IN | SYSTOLIC BLOOD PRESSURE: 122 MMHG | WEIGHT: 211 LBS | OXYGEN SATURATION: 98 % | TEMPERATURE: 98 F | DIASTOLIC BLOOD PRESSURE: 78 MMHG | BODY MASS INDEX: 33.12 KG/M2

## 2020-04-16 DIAGNOSIS — N18.4 CKD (CHRONIC KIDNEY DISEASE) STAGE 4, GFR 15-29 ML/MIN (HCC): ICD-10-CM

## 2020-04-16 DIAGNOSIS — I10 ESSENTIAL HYPERTENSION: ICD-10-CM

## 2020-04-16 DIAGNOSIS — M10.9 GOUT, UNSPECIFIED CAUSE, UNSPECIFIED CHRONICITY, UNSPECIFIED SITE: ICD-10-CM

## 2020-04-16 DIAGNOSIS — Z00.00 PHYSICAL EXAM: Primary | ICD-10-CM

## 2020-04-16 DIAGNOSIS — I25.10 CORONARY ARTERY DISEASE INVOLVING NATIVE HEART WITHOUT ANGINA PECTORIS, UNSPECIFIED VESSEL OR LESION TYPE: ICD-10-CM

## 2020-04-16 DIAGNOSIS — N40.0 BENIGN NON-NODULAR PROSTATIC HYPERPLASIA WITHOUT LOWER URINARY TRACT SYMPTOMS: ICD-10-CM

## 2020-04-16 PROCEDURE — 90471 IMMUNIZATION ADMIN: CPT | Performed by: INTERNAL MEDICINE

## 2020-04-16 PROCEDURE — 99396 PREV VISIT EST AGE 40-64: CPT | Performed by: INTERNAL MEDICINE

## 2020-04-16 PROCEDURE — 90732 PPSV23 VACC 2 YRS+ SUBQ/IM: CPT | Performed by: INTERNAL MEDICINE

## 2020-04-16 NOTE — PROGRESS NOTES
HPI:    Patient ID: Toney George is a 62year old male. Patient presents for continuing care. Patient has been off work secondary to COVID-23 pandemic. His mother-in-law recently  from COVID-23 infection--she was in nursing home.   Patient last h week        Comment: in 2 weeks      Drug use: No        Review of Systems   Constitutional: Negative for activity change, chills and fever. HENT: Negative for dental problem, postnasal drip, sinus pressure, sore throat and trouble swallowing.     Eyes: N mg total) by mouth as needed for Erectile Dysfunction.  8 tablet 0   • SPIRONOLACTONE 25 MG Oral Tab TAKE 1 TABLET BY MOUTH EVERY DAY (Patient taking differently: Pt taking 1/2 tablet once daily ) 90 tablet 3   • Clobetasol Propionate 0.05 % External Ointme place, and time. He has normal reflexes. No cranial nerve deficit. He exhibits normal muscle tone. Coordination normal.   Skin: No rash noted. Psychiatric: He has a normal mood and affect.  Thought content normal.              ASSESSMENT/PLAN:   Physical PNEUMOCOCCAL IMM, 24      Meds This Visit:  Requested Prescriptions      No prescriptions requested or ordered in this encounter       Imaging & Referrals:  PNEUMOCOCCAL IMM (PNEUMOVAX)       EF#[id number]

## 2020-05-07 ENCOUNTER — LAB ENCOUNTER (OUTPATIENT)
Dept: LAB | Facility: HOSPITAL | Age: 60
End: 2020-05-07
Attending: INTERNAL MEDICINE
Payer: COMMERCIAL

## 2020-05-07 DIAGNOSIS — M10.9 GOUT, UNSPECIFIED CAUSE, UNSPECIFIED CHRONICITY, UNSPECIFIED SITE: ICD-10-CM

## 2020-05-07 DIAGNOSIS — I10 ESSENTIAL HYPERTENSION: ICD-10-CM

## 2020-05-07 DIAGNOSIS — Z00.00 PHYSICAL EXAM: ICD-10-CM

## 2020-05-07 DIAGNOSIS — I25.10 CORONARY ARTERY DISEASE INVOLVING NATIVE HEART WITHOUT ANGINA PECTORIS, UNSPECIFIED VESSEL OR LESION TYPE: ICD-10-CM

## 2020-05-07 PROCEDURE — 83036 HEMOGLOBIN GLYCOSYLATED A1C: CPT

## 2020-05-07 PROCEDURE — 80061 LIPID PANEL: CPT

## 2020-05-07 PROCEDURE — 80053 COMPREHEN METABOLIC PANEL: CPT

## 2020-05-07 PROCEDURE — 84550 ASSAY OF BLOOD/URIC ACID: CPT

## 2020-05-07 PROCEDURE — 36415 COLL VENOUS BLD VENIPUNCTURE: CPT

## 2020-05-07 PROCEDURE — 85025 COMPLETE CBC W/AUTO DIFF WBC: CPT

## 2020-05-07 PROCEDURE — 84443 ASSAY THYROID STIM HORMONE: CPT

## 2020-05-20 ENCOUNTER — TELEPHONE (OUTPATIENT)
Dept: INTERNAL MEDICINE CLINIC | Facility: CLINIC | Age: 60
End: 2020-05-20

## 2020-05-20 DIAGNOSIS — R05.9 COUGH: Primary | ICD-10-CM

## 2020-05-20 NOTE — TELEPHONE ENCOUNTER
Patient calling asking for a Covid 19 test.  Feeling feverish, has no thermometer to take temp. Has no taste and coughing up phlegm. Has coworkers who have tested positive. Works in the same area, but not directly with them.     Best number to contact

## 2020-05-20 NOTE — TELEPHONE ENCOUNTER
Respiratory infection triage:    Fever:  []  No fever unsure  []  Fever>100.4    Cough:  [] Tight cough  [] Cough with exertion  [] Dry cough  [x] Sputum production, Color: yellowish ,white  Breathing:  [] Mild shortness of breath interfering with activity

## 2020-05-20 NOTE — TELEPHONE ENCOUNTER
Called patient and relayed DR. ELLISON message - verbalized understanding Has test scheduled tomorrow at 11 am

## 2020-05-20 NOTE — TELEPHONE ENCOUNTER
Patient needs COVID-19 testing. This has been ordered and [place] will contact him to set up time for this. Until then, patient needs to self isolate. He should try to stay in his own room and use his own bathroom if possible.   Other household members

## 2020-05-21 ENCOUNTER — LAB ENCOUNTER (OUTPATIENT)
Dept: LAB | Facility: HOSPITAL | Age: 60
End: 2020-05-21
Attending: INTERNAL MEDICINE
Payer: COMMERCIAL

## 2020-05-21 DIAGNOSIS — R05.9 COUGH: ICD-10-CM

## 2020-05-22 ENCOUNTER — PATIENT MESSAGE (OUTPATIENT)
Dept: INTERNAL MEDICINE CLINIC | Facility: CLINIC | Age: 60
End: 2020-05-22

## 2020-05-22 ENCOUNTER — TELEPHONE (OUTPATIENT)
Dept: CASE MANAGEMENT | Age: 60
End: 2020-05-22

## 2020-05-22 ENCOUNTER — TELEPHONE (OUTPATIENT)
Dept: INTERNAL MEDICINE CLINIC | Facility: CLINIC | Age: 60
End: 2020-05-22

## 2020-05-22 ENCOUNTER — PATIENT OUTREACH (OUTPATIENT)
Dept: CASE MANAGEMENT | Age: 60
End: 2020-05-22

## 2020-05-22 DIAGNOSIS — U07.1 COVID-19: ICD-10-CM

## 2020-05-22 PROCEDURE — A4931 REUSABLE ORAL THERMOMETER: HCPCS

## 2020-05-22 PROCEDURE — E0445 OXIMETER NON-INVASIVE: HCPCS

## 2020-05-22 NOTE — TELEPHONE ENCOUNTER
----- Message from Francisca Rick MD sent at 5/22/2020  8:51 AM CDT -----  COVID-19 testing is positive. Therefore, patient needs to be self quarantine. He should stay in his own room and use his own bathroom if possible.   Other household members shoul

## 2020-05-22 NOTE — PROGRESS NOTES
Home Monitoring Condition Update    Covid19+ test date: 5/21/2020      Consent Verification:  Assessment Completed With: Patient  HIPAA Verified?   Yes    COVID-19 HOME MONITORING 5/22/2020   How are you feeling \"I'm feeling OK, I've been better\"   Emily call 911.       Time spent this encounter reviewing chart, speaking with patient, gathering resources  Total Time: 10  minutes

## 2020-05-22 NOTE — PROGRESS NOTES
Spoke to pt, confirmed need for pulse ox and thermometer. Mailed to pt as no one can pick it up who is not COVID+. Pt advised insurance will be billed for it but should be covered under the new CARES act. Pt stated he understands and is agreeable.

## 2020-05-22 NOTE — TELEPHONE ENCOUNTER
From: Abril Radford  To:  Param Swenson MD  Sent: 5/22/2020 9:16 AM CDT  Subject: Test Results Question    Please let me know my covid 19 year results I took the test yesterday

## 2020-05-22 NOTE — TELEPHONE ENCOUNTER
From: Betsey Linder  To:  Tata Balderrama MD  Sent: 5/22/2020 10:09 AM CDT  Subject: Other    My job wants me to send them paperwork showing me positive for covid 19

## 2020-05-26 ENCOUNTER — PATIENT OUTREACH (OUTPATIENT)
Dept: CASE MANAGEMENT | Age: 60
End: 2020-05-26

## 2020-05-26 NOTE — PROGRESS NOTES
Home Monitoring Condition Update    Covid19+ test date: 5/21/20  Contact date: 5/26/20      Consent Verification:  Assessment Completed With: Patient  HIPAA Verified?   Yes    COVID-19 HOME MONITORING 5/26/2020   Temperature 98.8   Reading From Mouth   Pu update. Patient advised to take tylenol as needed for symptoms. Patient instructed to follow manufacturers instructions and not to exceed 3 grams per day. Patient verbalizes understanding.   Patient advised to inform their Employee Health department or Man

## 2020-05-27 ENCOUNTER — PATIENT OUTREACH (OUTPATIENT)
Dept: CASE MANAGEMENT | Age: 60
End: 2020-05-27

## 2020-05-27 NOTE — PROGRESS NOTES
Home Monitoring Condition Update    Covid19+ test date: 5/21/20  Contact date: 5/27/20      Consent Verification:  Assessment Completed With: Patient  HIPAA Verified?   Yes    COVID-19 HOME MONITORING 5/27/2020   Temperature 98.5   Reading From Mouth   Pu isolate away from other household members when possible and stay completely isolated from the general public 3 days after symptoms resolve or 10 days total (whichever is longer). Advised patient If symptoms worsen/ medical emergency to call 911.       Time

## 2020-05-28 ENCOUNTER — PATIENT OUTREACH (OUTPATIENT)
Dept: CASE MANAGEMENT | Age: 60
End: 2020-05-28

## 2020-05-28 NOTE — PROGRESS NOTES
Home Monitoring Condition Update    Covid19+ test date: 5/21/20  Contact date: 5/28/20      Consent Verification:  Assessment Completed With: Patient  HIPAA Verified?   Yes    COVID-19 HOME MONITORING 5/28/2020   Temperature 97.7   Reading From Mouth   Pu gargles helping. Denies SOB. Appetite is improving. Sense of smell and taste starting to return. Patient advised to inform their Employee Health department or Manager when they have tested positive for COVID-19.     The patient was also directed to contin

## 2020-05-29 ENCOUNTER — TELEPHONE (OUTPATIENT)
Dept: CASE MANAGEMENT | Age: 60
End: 2020-05-29

## 2020-05-29 ENCOUNTER — VIRTUAL PHONE E/M (OUTPATIENT)
Dept: INTERNAL MEDICINE CLINIC | Facility: CLINIC | Age: 60
End: 2020-05-29

## 2020-05-29 DIAGNOSIS — N18.4 CKD (CHRONIC KIDNEY DISEASE) STAGE 4, GFR 15-29 ML/MIN (HCC): ICD-10-CM

## 2020-05-29 DIAGNOSIS — I10 ESSENTIAL HYPERTENSION: ICD-10-CM

## 2020-05-29 DIAGNOSIS — U07.1 COVID-19 VIRUS INFECTION: Primary | ICD-10-CM

## 2020-05-29 DIAGNOSIS — I25.10 CORONARY ARTERY DISEASE INVOLVING NATIVE HEART WITHOUT ANGINA PECTORIS, UNSPECIFIED VESSEL OR LESION TYPE: ICD-10-CM

## 2020-05-29 PROCEDURE — 99213 OFFICE O/P EST LOW 20 MIN: CPT | Performed by: INTERNAL MEDICINE

## 2020-05-29 NOTE — PROGRESS NOTES
Virtual Telephone Check-In    Odalys Taylor consents to a Virtual/Telephone Check-In visit on 05/29/20. Patient has been referred to the [facility] website at [email or web address] to review the yearly Consent to Treat document.     Patient Tonya Lylelion

## 2020-05-30 ENCOUNTER — PATIENT OUTREACH (OUTPATIENT)
Dept: CASE MANAGEMENT | Age: 60
End: 2020-05-30

## 2020-05-30 NOTE — PROGRESS NOTES
Home Monitoring Condition Update    Covid19+ test date:  5/28/2020      Consent Verification:  Assessment Completed With: Patient  HIPAA Verified?   Yes    COVID-19 HOME MONITORING 5/30/2020   Temperature 97.2   Reading From Mouth   Pulse -   How are you 911.    Patient advised to inform their Employee Health department or Manager when they have tested positive for COVID-19.       The patient was also directed to continue to isolate away from other household members when possible and stay completely isolate

## 2020-06-01 ENCOUNTER — PATIENT OUTREACH (OUTPATIENT)
Dept: CASE MANAGEMENT | Age: 60
End: 2020-06-01

## 2020-06-01 NOTE — PROGRESS NOTES
Patient informed home monitoring program discontinued per PCP. Patient will contact PCP office with any concerns/new or worsening symptoms.         June 1, 2020   Ximena Constantino MD   to [facility] Clinical Staff         8:25 AM   Note      OK to discontin

## 2020-06-11 ENCOUNTER — PATIENT MESSAGE (OUTPATIENT)
Dept: INTERNAL MEDICINE CLINIC | Facility: CLINIC | Age: 60
End: 2020-06-11

## 2020-06-11 NOTE — TELEPHONE ENCOUNTER
From: Narinder Almazan  To:  Chun Santacruz MD  Sent: 6/11/2020 9:56 AM CDT  Subject: Other    Please send me a letter to give to my employer allowing me to return to work Monday June15

## 2020-06-15 ENCOUNTER — TELEPHONE (OUTPATIENT)
Dept: INTERNAL MEDICINE CLINIC | Facility: CLINIC | Age: 60
End: 2020-06-15

## 2020-06-15 NOTE — TELEPHONE ENCOUNTER
Katie called from Dr King Distance office  Requesting copy of recent labs for pt appt today at 3:00  K[facility]#[phone]  Tasked to nursing

## 2020-06-19 ENCOUNTER — MED REC SCAN ONLY (OUTPATIENT)
Dept: INTERNAL MEDICINE CLINIC | Facility: CLINIC | Age: 60
End: 2020-06-19

## 2020-09-09 NOTE — TELEPHONE ENCOUNTER
Refill request received for spironolactone 25mg daily. Pt reported taking spironolactone 12.5mg daily on 12/18/19.  Per Dr. General Castro note 12/18/19 \"Patient had spironolactone dose [place] secondary to hyperkalemia and follow-up potassium level was normal.\"      T

## 2020-09-10 RX ORDER — SPIRONOLACTONE 25 MG/1
12.5 TABLET ORAL DAILY
Qty: 45 TABLET | Refills: 3 | Status: SHIPPED | OUTPATIENT
Start: 2020-09-10 | End: 2021-12-21

## 2020-09-10 RX ORDER — LOSARTAN POTASSIUM 100 MG/1
TABLET ORAL
Qty: 90 TABLET | Refills: 3 | Status: SHIPPED | OUTPATIENT
Start: 2020-09-10 | End: 2021-09-14

## 2020-09-11 ENCOUNTER — TELEPHONE (OUTPATIENT)
Dept: INTERNAL MEDICINE CLINIC | Facility: CLINIC | Age: 60
End: 2020-09-11

## 2020-09-11 NOTE — TELEPHONE ENCOUNTER
Yes, would recommend COVID-19 testing. Patient can get this done at any state site. Otherwise continue to push fluids, use Tylenol, over-the-counter decongestant if needed.   Yes at this time for seasonal flu vaccine--patient can get at any time as long a

## 2020-09-11 NOTE — TELEPHONE ENCOUNTER
Reviewed and Rx done  Requested Prescriptions     Signed Prescriptions Disp Refills   • LOSARTAN 100 MG Oral Tab 90 tablet 3     Sig: TAKE 1 TABLET BY MOUTH EVERY DAY     Authorizing Provider: Rosemarie Mcdonald     Ordering User: Liyah escobar

## 2020-09-11 NOTE — TELEPHONE ENCOUNTER
----- Message from Ramesh Bar sent at 9/10/2020  5:23 PM CDT -----  Regarding: Non-Urgent Medical Question  Contact: [phone]  I feel like I'm catching a cold. I don't have a cough or a fever but a scratchy throat . Should I be concerned?  Also is it t

## 2020-09-13 ENCOUNTER — PATIENT MESSAGE (OUTPATIENT)
Dept: INTERNAL MEDICINE CLINIC | Facility: CLINIC | Age: 60
End: 2020-09-13

## 2020-09-14 ENCOUNTER — TELEPHONE (OUTPATIENT)
Dept: INTERNAL MEDICINE CLINIC | Facility: CLINIC | Age: 60
End: 2020-09-14

## 2020-09-14 DIAGNOSIS — R50.9 FEVER, UNSPECIFIED FEVER CAUSE: Primary | ICD-10-CM

## 2020-09-14 NOTE — TELEPHONE ENCOUNTER
From: Judie Sánchez  To: Adolfo Francisco MD  Sent: 9/13/2020 4:52 PM CDT  Subject: Non-Urgent Medical Question    I'd like to come see you as soon as possible. I would like to have a flu shot and talk to you about several other issues I'm having like errect

## 2020-09-14 NOTE — TELEPHONE ENCOUNTER
Should get COVID-19 testing. If this is negative, then patient can schedule appointment to discuss other issues. However fatigue and loss of taste/smell sometimes persist for weeks to months.   Repeat testing has been ordered

## 2020-09-14 NOTE — TELEPHONE ENCOUNTER
Ambrocio Ortiz   to Carol Ann Newman MD            9/13/20 5:45 PM   Please call me I may need another covid test my symptoms are fatigue and slightly elevated temperature also my since of smell and taste are deminshed       Respiratory infection triage:

## 2020-09-14 NOTE — TELEPHONE ENCOUNTER
Pt. Is calling to speak with Dr. Nicholas Rizvi before he takes a COVID19 test he states it's important ph.  # [phone]  Routed to clinical routed [facility] to [facility]

## 2020-09-14 NOTE — TELEPHONE ENCOUNTER
Called patient and relayed Dr Sade Valero message to him. Patient verbalized understanding. He says someone already called him about scheduling, he will try to call them back. He will let us know if he has any issues.

## 2020-09-15 ENCOUNTER — APPOINTMENT (OUTPATIENT)
Dept: LAB | Facility: HOSPITAL | Age: 60
End: 2020-09-15
Attending: INTERNAL MEDICINE
Payer: COMMERCIAL

## 2020-09-15 DIAGNOSIS — R50.9 FEVER, UNSPECIFIED FEVER CAUSE: ICD-10-CM

## 2020-09-17 LAB — SARS-COV-2 RNA RESP QL NAA+PROBE: NOT DETECTED

## 2020-09-18 RX ORDER — ATORVASTATIN CALCIUM 80 MG/1
80 TABLET, FILM COATED ORAL DAILY
Qty: 90 TABLET | Refills: 3 | Status: SHIPPED | OUTPATIENT
Start: 2020-09-18 | End: 2021-09-14

## 2020-09-18 RX ORDER — AMLODIPINE BESYLATE 10 MG/1
10 TABLET ORAL DAILY
Qty: 90 TABLET | Refills: 3 | Status: SHIPPED | OUTPATIENT
Start: 2020-09-18 | End: 2021-09-14

## 2020-09-28 ENCOUNTER — TELEPHONE (OUTPATIENT)
Dept: INTERNAL MEDICINE CLINIC | Facility: CLINIC | Age: 60
End: 2020-09-28

## 2020-09-29 NOTE — TELEPHONE ENCOUNTER
Noted, refill sent. Requested Prescriptions     Signed Prescriptions Disp Refills   • metoprolol Tartrate 25 MG Oral Tab 180 tablet 0     Sig: Take 1 tablet (25 mg total) by mouth 2 (two) times daily.      Authorizing Provider: Keith Leija

## 2020-09-30 ENCOUNTER — PATIENT MESSAGE (OUTPATIENT)
Dept: INTERNAL MEDICINE CLINIC | Facility: CLINIC | Age: 60
End: 2020-09-30

## 2020-09-30 NOTE — TELEPHONE ENCOUNTER
The original prescription was discontinued on 1/7/2020 by Mercy Gallego LPN for the following reason: Dose adjustment.     Uloric 40mg daily is currently in pt's active med list    mychart sent to pt

## 2020-10-01 RX ORDER — FEBUXOSTAT 80 MG/1
TABLET, FILM COATED ORAL
Qty: 90 TABLET | Refills: 3 | OUTPATIENT
Start: 2020-10-01

## 2020-10-01 RX ORDER — FEBUXOSTAT 40 MG/1
40 TABLET, FILM COATED ORAL DAILY
Qty: 90 TABLET | Refills: 3 | Status: SHIPPED | OUTPATIENT
Start: 2020-10-01 | End: 2021-09-29

## 2020-10-01 NOTE — TELEPHONE ENCOUNTER
Patient's response added to refill encounter where message originated  Will close this encounter 9/28/20.

## 2020-10-01 NOTE — TELEPHONE ENCOUNTER
Patient's response:    From: Khoi Bender  To:  Esperanza Elizabeth MD  Sent: 9/30/2020  5:56 PM CDT  Subject: Other    I currently take Febustat 80mg one half a pill daily              To Laura--can you please help with this refill request

## 2020-10-01 NOTE — TELEPHONE ENCOUNTER
To Dr.H Pavithra stokes confirm Febuxostat 40 mg daily appr. For pt (self reported dose) ;   Route back to me and I will send [employer]

## 2020-10-01 NOTE — TELEPHONE ENCOUNTER
From: Yadira Ruiz  To:  Donavan Willoughby MD  Sent: 9/30/2020 5:56 PM CDT  Subject: Other    I currently take Febustat 80mg one half a pill daily

## 2020-10-01 NOTE — TELEPHONE ENCOUNTER
yuly sent  Requested Prescriptions     Signed Prescriptions Disp Refills   • febuxostat 40 MG Oral Tab 90 tablet 3     Sig: Take 1 tablet (40 mg total) by mouth daily.      Authorizing Provider: Jose Daniel Candelaria     Ordering User: Alecia Cabral

## 2020-10-22 NOTE — TELEPHONE ENCOUNTER
Called patient who is feeling well - relayed DR. SCOT servin and he will see cardiology today and see if he can get flu vaccine there

## 2020-10-29 ENCOUNTER — OFFICE VISIT (OUTPATIENT)
Dept: INTERNAL MEDICINE CLINIC | Facility: CLINIC | Age: 60
End: 2020-10-29
Payer: COMMERCIAL

## 2020-10-29 VITALS
TEMPERATURE: 98 F | WEIGHT: 204 LBS | HEART RATE: 52 BPM | DIASTOLIC BLOOD PRESSURE: 86 MMHG | OXYGEN SATURATION: 99 % | BODY MASS INDEX: 32.02 KG/M2 | SYSTOLIC BLOOD PRESSURE: 126 MMHG | HEIGHT: 67 IN

## 2020-10-29 DIAGNOSIS — I10 ESSENTIAL HYPERTENSION: Primary | ICD-10-CM

## 2020-10-29 DIAGNOSIS — N40.0 BENIGN NON-NODULAR PROSTATIC HYPERPLASIA WITHOUT LOWER URINARY TRACT SYMPTOMS: ICD-10-CM

## 2020-10-29 DIAGNOSIS — N28.9 NEPHROPATHY: ICD-10-CM

## 2020-10-29 DIAGNOSIS — N18.4 CKD (CHRONIC KIDNEY DISEASE) STAGE 4, GFR 15-29 ML/MIN (HCC): ICD-10-CM

## 2020-10-29 DIAGNOSIS — I25.10 CORONARY ARTERY DISEASE INVOLVING NATIVE HEART WITHOUT ANGINA PECTORIS, UNSPECIFIED VESSEL OR LESION TYPE: ICD-10-CM

## 2020-10-29 PROCEDURE — 90686 IIV4 VACC NO PRSV 0.5 ML IM: CPT | Performed by: INTERNAL MEDICINE

## 2020-10-29 PROCEDURE — 3074F SYST BP LT 130 MM HG: CPT | Performed by: INTERNAL MEDICINE

## 2020-10-29 PROCEDURE — 99213 OFFICE O/P EST LOW 20 MIN: CPT | Performed by: INTERNAL MEDICINE

## 2020-10-29 PROCEDURE — 90471 IMMUNIZATION ADMIN: CPT | Performed by: INTERNAL MEDICINE

## 2020-10-29 PROCEDURE — 3079F DIAST BP 80-89 MM HG: CPT | Performed by: INTERNAL MEDICINE

## 2020-10-29 PROCEDURE — 3008F BODY MASS INDEX DOCD: CPT | Performed by: INTERNAL MEDICINE

## 2020-10-29 RX ORDER — TADALAFIL 20 MG/1
20 TABLET ORAL
Qty: 12 TABLET | Refills: 6 | Status: SHIPPED | OUTPATIENT
Start: 2020-10-29 | End: 2020-11-05

## 2020-10-29 NOTE — PROGRESS NOTES
HPI:    Patient ID: Kalin Sánchez is a 62year old male. Presents for f/u    Patient had COVID-19 infection in May. Overall he feels much better. However he still does note significant fatigue and has intermittent loss of taste and smell.     He does fe Meclizine HCl 25 MG Oral Tab Take 1 tablet (25 mg total) by mouth 3 (three) times daily as needed.  90 tablet 3   • Clobetasol Propionate 0.05 % External Ointment Apply twice daily 60 g 3   • omega-3 fatty acids (FISH OIL) 1000 MG Oral Cap Take 1,000 mg by symptoms. However he feels the symptoms are minor and would prefer not to be on medication at this time. Patient will receive seasonal flu vaccine today. Otherwise he will follow-up again in 6 months.   Orders Placed This Encounter      Flulaval 6 mo

## 2020-10-30 ENCOUNTER — TELEPHONE (OUTPATIENT)
Dept: INTERNAL MEDICINE CLINIC | Facility: CLINIC | Age: 60
End: 2020-10-30

## 2020-10-30 NOTE — TELEPHONE ENCOUNTER
Prior Authorization required for Tadalafil 20 mg  Please contact [id number]  ID# [id number]    Fax in purple folder

## 2020-11-03 NOTE — TELEPHONE ENCOUNTER
Prior Authorization faxed to [phone]  Confirmation received   Placed in red folder with barcode.

## 2020-11-04 NOTE — TELEPHONE ENCOUNTER
Additional form rec'd regarding Prior Authorization for:   Tadalafil  Fax placed in purple folder  Tasked to [employer]

## 2020-11-05 RX ORDER — TADALAFIL 20 MG/1
20 TABLET ORAL
Qty: 6 TABLET | Refills: 6 | Status: SHIPPED | OUTPATIENT
Start: 2020-11-05 | End: 2020-11-16

## 2020-11-12 NOTE — TELEPHONE ENCOUNTER
Faxed PA to [phone], confirmation received  Faxed PA to [phone], confirmation received     placed in red folder

## 2020-11-12 NOTE — TELEPHONE ENCOUNTER
Pat Kate rec'd from [address] [place] [address] for:   Tadalafil  Denied because\"you have requested more than the maximum quantity allowed by your plan\"  Fax placed in red folder  Tasked to RX

## 2020-11-16 ENCOUNTER — TELEPHONE (OUTPATIENT)
Dept: INTERNAL MEDICINE CLINIC | Facility: CLINIC | Age: 60
End: 2020-11-16

## 2020-11-16 ENCOUNTER — PATIENT MESSAGE (OUTPATIENT)
Dept: INTERNAL MEDICINE CLINIC | Facility: CLINIC | Age: 60
End: 2020-11-16

## 2020-11-16 RX ORDER — TADALAFIL 20 MG/1
20 TABLET ORAL
Qty: 6 TABLET | Refills: 6 | Status: SHIPPED | OUTPATIENT
Start: 2020-11-16

## 2020-11-17 NOTE — TELEPHONE ENCOUNTER
From: Quinton Faust  To:  Cyndy Bean MD  Sent: 11/16/2020 12:59 PM CST  Subject: Precious Ugarte can you send the prescription Tadalafil 20 mg 6 tablets to [facility]

## 2020-11-17 NOTE — TELEPHONE ENCOUNTER
From: Arben Thompson  To:  Jemma Blake MD  Sent: 11/16/2020 12:06 PM CST  Subject: Prescription Question    Tadalafil 20 mg 6 tablets

## 2020-11-17 NOTE — TELEPHONE ENCOUNTER
RE: Ayla Garnica 11/16/2020 6:53 PM     To: Lilian Zamora      From: Yao Calderon CMA      Created: 11/16/2020 6:53 PM        Hi Yong Sainz,   We changed the prescription to [facility] for your Tadalafil as requested.    -[facility] Clinical Team

## 2020-11-17 NOTE — TELEPHONE ENCOUNTER
----- Message from Karen Schrader sent at 11/16/2020 12:59 PM CST -----  Regarding: Other  Contact: [phone] can you send the prescription Tadalafil 20 mg 6 tablets to [facility]

## 2020-11-21 ENCOUNTER — TELEPHONE (OUTPATIENT)
Dept: INTERNAL MEDICINE CLINIC | Facility: CLINIC | Age: 60
End: 2020-11-21

## 2020-11-22 RX ORDER — ALPRAZOLAM 0.25 MG/1
0.25 TABLET ORAL 2 TIMES DAILY PRN
Qty: 30 TABLET | Refills: 1 | Status: SHIPPED | OUTPATIENT
Start: 2020-11-22 | End: 2021-06-18

## 2020-12-07 ENCOUNTER — MED REC SCAN ONLY (OUTPATIENT)
Dept: INTERNAL MEDICINE CLINIC | Facility: CLINIC | Age: 60
End: 2020-12-07

## 2020-12-21 NOTE — TELEPHONE ENCOUNTER
To Hitesh Pedro --- Purple Folder Localized Dermabrasion Text: The patient was draped in routine manner.  Localized dermabrasion using 3 x 17 mm wire brush was performed in routine manner to papillary dermis. This spot dermabrasion is being performed to complete skin cancer reconstruction. It also will eliminate the other sun damaged precancerous cells that are known to be part of the regional effect of a lifetime's worth of sun exposure. This localized dermabrasion is therapeutic and should not be considered cosmetic in any regard. Localized Dermabrasion With Wire Brush Text: The patient was draped in routine manner.  Localized dermabrasion using 3 x 17 mm wire brush was performed in routine manner to papillary dermis. This spot dermabrasion is being performed to complete skin cancer reconstruction. It also will eliminate the other sun damaged precancerous cells that are known to be part of the regional effect of a lifetime's worth of sun exposure. This localized dermabrasion is therapeutic and should not be considered cosmetic in any regard.

## 2020-12-30 ENCOUNTER — PATIENT MESSAGE (OUTPATIENT)
Dept: INTERNAL MEDICINE CLINIC | Facility: CLINIC | Age: 60
End: 2020-12-30

## 2020-12-30 NOTE — TELEPHONE ENCOUNTER
From: Grover Yadav  To: Gale Squires MD  Sent: 12/30/2020 10:59 AM CST  Subject: Other    Hi doc I am going through some mental and personal issues in my life and with my marriage.  I was hoping you could recommend a doctor or psychologist I could visit

## 2021-03-18 DIAGNOSIS — Z23 NEED FOR VACCINATION: ICD-10-CM

## 2021-03-21 ENCOUNTER — TELEPHONE (OUTPATIENT)
Dept: INTERNAL MEDICINE CLINIC | Facility: CLINIC | Age: 61
End: 2021-03-21

## 2021-03-22 RX ORDER — MECLIZINE HYDROCHLORIDE 25 MG/1
TABLET ORAL
Qty: 90 TABLET | Refills: 3 | Status: SHIPPED | OUTPATIENT
Start: 2021-03-22

## 2021-03-24 ENCOUNTER — IMMUNIZATION (OUTPATIENT)
Dept: LAB | Facility: HOSPITAL | Age: 61
End: 2021-03-24
Attending: HOSPITALIST
Payer: COMMERCIAL

## 2021-03-24 DIAGNOSIS — Z23 NEED FOR VACCINATION: Primary | ICD-10-CM

## 2021-03-24 PROCEDURE — 0011A SARSCOV2 VAC 100MCG/0.5ML IM: CPT

## 2021-04-21 ENCOUNTER — IMMUNIZATION (OUTPATIENT)
Dept: LAB | Facility: HOSPITAL | Age: 61
End: 2021-04-21
Attending: EMERGENCY MEDICINE
Payer: COMMERCIAL

## 2021-04-21 DIAGNOSIS — Z23 NEED FOR VACCINATION: Primary | ICD-10-CM

## 2021-04-21 PROCEDURE — 0012A SARSCOV2 VAC 100MCG/0.5ML IM: CPT

## 2021-06-18 ENCOUNTER — TELEPHONE (OUTPATIENT)
Dept: INTERNAL MEDICINE CLINIC | Facility: CLINIC | Age: 61
End: 2021-06-18

## 2021-06-18 RX ORDER — ALPRAZOLAM 0.25 MG/1
0.25 TABLET ORAL 2 TIMES DAILY PRN
Qty: 30 TABLET | Refills: 0 | Status: SHIPPED | OUTPATIENT
Start: 2021-06-18 | End: 2021-10-31

## 2021-06-18 NOTE — TELEPHONE ENCOUNTER
To MD:  The above refill request is for a controlled substance. Please review pended medication order. Print and sign for staff to fax to pharmacy or prescribe electronically.     Last office visit: 10/29/20  Last time refill sent and quantity/refills: #

## 2021-07-07 RX ORDER — FEBUXOSTAT 40 MG/1
TABLET, FILM COATED ORAL
Qty: 90 TABLET | Refills: 3 | OUTPATIENT
Start: 2021-07-07

## 2021-07-07 NOTE — TELEPHONE ENCOUNTER
Current refill request refused due to refill is either a duplicate request or has active refills at the pharmacy. Check previous templates.     Requested Prescriptions     Refused Prescriptions Disp Refills   • FEBUXOSTAT 40 MG Oral Tab [Pharmacy Med Name:

## 2021-07-15 ENCOUNTER — OFFICE VISIT (OUTPATIENT)
Dept: INTERNAL MEDICINE CLINIC | Facility: CLINIC | Age: 61
End: 2021-07-15
Payer: COMMERCIAL

## 2021-07-15 ENCOUNTER — LAB ENCOUNTER (OUTPATIENT)
Dept: LAB | Age: 61
End: 2021-07-15
Attending: INTERNAL MEDICINE
Payer: COMMERCIAL

## 2021-07-15 VITALS
HEIGHT: 67 IN | DIASTOLIC BLOOD PRESSURE: 86 MMHG | HEART RATE: 59 BPM | OXYGEN SATURATION: 96 % | BODY MASS INDEX: 33.43 KG/M2 | SYSTOLIC BLOOD PRESSURE: 134 MMHG | WEIGHT: 213 LBS | TEMPERATURE: 98 F

## 2021-07-15 DIAGNOSIS — E78.5 HYPERLIPIDEMIA, UNSPECIFIED HYPERLIPIDEMIA TYPE: ICD-10-CM

## 2021-07-15 DIAGNOSIS — M10.9 GOUT, UNSPECIFIED CAUSE, UNSPECIFIED CHRONICITY, UNSPECIFIED SITE: ICD-10-CM

## 2021-07-15 DIAGNOSIS — Z12.9 CANCER SCREENING: ICD-10-CM

## 2021-07-15 DIAGNOSIS — I25.10 CORONARY ARTERY DISEASE INVOLVING NATIVE HEART WITHOUT ANGINA PECTORIS, UNSPECIFIED VESSEL OR LESION TYPE: ICD-10-CM

## 2021-07-15 DIAGNOSIS — Z00.00 PHYSICAL EXAM: ICD-10-CM

## 2021-07-15 DIAGNOSIS — Z00.00 PHYSICAL EXAM: Primary | ICD-10-CM

## 2021-07-15 DIAGNOSIS — I10 ESSENTIAL HYPERTENSION: ICD-10-CM

## 2021-07-15 DIAGNOSIS — N18.4 CKD (CHRONIC KIDNEY DISEASE) STAGE 4, GFR 15-29 ML/MIN (HCC): ICD-10-CM

## 2021-07-15 DIAGNOSIS — N28.9 NEPHROPATHY: ICD-10-CM

## 2021-07-15 DIAGNOSIS — Z86.16 HISTORY OF 2019 NOVEL CORONAVIRUS DISEASE (COVID-19): ICD-10-CM

## 2021-07-15 DIAGNOSIS — F41.9 ANXIETY: ICD-10-CM

## 2021-07-15 LAB
ALBUMIN SERPL-MCNC: 3.8 G/DL (ref 3.4–5)
ALBUMIN/GLOB SERPL: 0.9 {RATIO} (ref 1–2)
ALP LIVER SERPL-CCNC: 77 U/L
ALT SERPL-CCNC: 48 U/L
ANION GAP SERPL CALC-SCNC: 6 MMOL/L (ref 0–18)
AST SERPL-CCNC: 28 U/L (ref 15–37)
BASOPHILS # BLD AUTO: 0.03 X10(3) UL (ref 0–0.2)
BASOPHILS NFR BLD AUTO: 0.6 %
BILIRUB SERPL-MCNC: 0.5 MG/DL (ref 0.1–2)
BUN BLD-MCNC: 26 MG/DL (ref 7–18)
BUN/CREAT SERPL: 12.6 (ref 10–20)
CALCIUM BLD-MCNC: 9.3 MG/DL (ref 8.5–10.1)
CHLORIDE SERPL-SCNC: 112 MMOL/L (ref 98–112)
CHOLEST SMN-MCNC: 135 MG/DL (ref ?–200)
CO2 SERPL-SCNC: 23 MMOL/L (ref 21–32)
COMPLEXED PSA SERPL-MCNC: 4.81 NG/ML (ref ?–4)
CREAT BLD-MCNC: 2.07 MG/DL
DEPRECATED RDW RBC AUTO: 45.1 FL (ref 35.1–46.3)
EOSINOPHIL # BLD AUTO: 0.37 X10(3) UL (ref 0–0.7)
EOSINOPHIL NFR BLD AUTO: 6.9 %
ERYTHROCYTE [DISTWIDTH] IN BLOOD BY AUTOMATED COUNT: 12.9 % (ref 11–15)
EST. AVERAGE GLUCOSE BLD GHB EST-MCNC: 114 MG/DL (ref 68–126)
GLOBULIN PLAS-MCNC: 4.2 G/DL (ref 2.8–4.4)
GLUCOSE BLD-MCNC: 83 MG/DL (ref 70–99)
HBA1C MFR BLD HPLC: 5.6 % (ref ?–5.7)
HCT VFR BLD AUTO: 41.5 %
HDLC SERPL-MCNC: 37 MG/DL (ref 40–59)
HGB BLD-MCNC: 13.8 G/DL
IMM GRANULOCYTES # BLD AUTO: 0.02 X10(3) UL (ref 0–1)
IMM GRANULOCYTES NFR BLD: 0.4 %
LDLC SERPL CALC-MCNC: 75 MG/DL (ref ?–100)
LYMPHOCYTES # BLD AUTO: 1.56 X10(3) UL (ref 1–4)
LYMPHOCYTES NFR BLD AUTO: 29.1 %
M PROTEIN MFR SERPL ELPH: 8 G/DL (ref 6.4–8.2)
MCH RBC QN AUTO: 31.4 PG (ref 26–34)
MCHC RBC AUTO-ENTMCNC: 33.3 G/DL (ref 31–37)
MCV RBC AUTO: 94.5 FL
MONOCYTES # BLD AUTO: 0.61 X10(3) UL (ref 0.1–1)
MONOCYTES NFR BLD AUTO: 11.4 %
NEUTROPHILS # BLD AUTO: 2.78 X10 (3) UL (ref 1.5–7.7)
NEUTROPHILS # BLD AUTO: 2.78 X10(3) UL (ref 1.5–7.7)
NEUTROPHILS NFR BLD AUTO: 51.6 %
NONHDLC SERPL-MCNC: 98 MG/DL (ref ?–130)
OSMOLALITY SERPL CALC.SUM OF ELEC: 296 MOSM/KG (ref 275–295)
PATIENT FASTING Y/N/NP: YES
PATIENT FASTING Y/N/NP: YES
PLATELET # BLD AUTO: 156 10(3)UL (ref 150–450)
POTASSIUM SERPL-SCNC: 4.7 MMOL/L (ref 3.5–5.1)
RBC # BLD AUTO: 4.39 X10(6)UL
SODIUM SERPL-SCNC: 141 MMOL/L (ref 136–145)
TRIGL SERPL-MCNC: 127 MG/DL (ref 30–149)
TSI SER-ACNC: 2.25 MIU/ML (ref 0.36–3.74)
URATE SERPL-MCNC: 4.8 MG/DL
VLDLC SERPL CALC-MCNC: 20 MG/DL (ref 0–30)
WBC # BLD AUTO: 5.4 X10(3) UL (ref 4–11)

## 2021-07-15 PROCEDURE — 36415 COLL VENOUS BLD VENIPUNCTURE: CPT

## 2021-07-15 PROCEDURE — 84550 ASSAY OF BLOOD/URIC ACID: CPT

## 2021-07-15 PROCEDURE — 84443 ASSAY THYROID STIM HORMONE: CPT

## 2021-07-15 PROCEDURE — 3075F SYST BP GE 130 - 139MM HG: CPT | Performed by: INTERNAL MEDICINE

## 2021-07-15 PROCEDURE — 83036 HEMOGLOBIN GLYCOSYLATED A1C: CPT

## 2021-07-15 PROCEDURE — 80061 LIPID PANEL: CPT

## 2021-07-15 PROCEDURE — 3079F DIAST BP 80-89 MM HG: CPT | Performed by: INTERNAL MEDICINE

## 2021-07-15 PROCEDURE — 85025 COMPLETE CBC W/AUTO DIFF WBC: CPT

## 2021-07-15 PROCEDURE — 3008F BODY MASS INDEX DOCD: CPT | Performed by: INTERNAL MEDICINE

## 2021-07-15 PROCEDURE — 99396 PREV VISIT EST AGE 40-64: CPT | Performed by: INTERNAL MEDICINE

## 2021-07-15 PROCEDURE — 80053 COMPREHEN METABOLIC PANEL: CPT

## 2021-07-15 RX ORDER — HYDROCORTISONE 25 MG/ML
LOTION TOPICAL
COMMUNITY
Start: 2021-05-21

## 2021-07-15 NOTE — PROGRESS NOTES
Cologuard order signed by Dr. Cristiana Robison and patient. Faxed to [facility] at [phone]. Copy to scanning.

## 2021-07-15 NOTE — PROGRESS NOTES
HPI:    Patient ID: Abril Radford is a 62year old male.     Presents for physical exam    Feels fatigued, but has been working more hours due to covid pandemic    Also has been feeling anxiety--however this has improved since patient started seen psychologi breath. Cardiovascular: Negative for chest pain, palpitations and leg swelling. Gastrointestinal: Negative for abdominal pain, blood in stool, constipation, nausea and vomiting. Endocrine: Negative for cold intolerance and heat intolerance.    Genito OIL) 1000 MG Oral Cap Take 1,000 mg by mouth daily. • aspirin 81 MG Oral Tab Take 81 mg by mouth daily.        Allergies:No Known Allergies   PHYSICAL EXAM:   /86   Pulse 59   Temp 98.2 °F (36.8 °C) (Oral)   Ht 5' 7\" (1.702 m)   Wt 213 lb (96.6 k exam  (primary encounter diagnosis)  Essential hypertension  Coronary artery disease involving native heart without angina pectoris, unspecified vessel or lesion type  Gout, unspecified cause, unspecified chronicity, unspecified site  Nephropathy  Ckd (chr prescriptions requested or ordered in this encounter       Imaging & Referrals:  [address] (EXTERNAL)       #[phone]

## 2021-07-16 DIAGNOSIS — R97.20 ELEVATED PSA: Primary | ICD-10-CM

## 2021-07-28 ENCOUNTER — PATIENT MESSAGE (OUTPATIENT)
Dept: INTERNAL MEDICINE CLINIC | Facility: CLINIC | Age: 61
End: 2021-07-28

## 2021-07-28 ENCOUNTER — LAB ENCOUNTER (OUTPATIENT)
Dept: LAB | Age: 61
End: 2021-07-28
Attending: INTERNAL MEDICINE
Payer: COMMERCIAL

## 2021-07-28 DIAGNOSIS — R97.20 ELEVATED PSA: ICD-10-CM

## 2021-07-28 LAB — PSA SERPL-MCNC: 2.38 NG/ML (ref ?–4)

## 2021-07-28 PROCEDURE — 36415 COLL VENOUS BLD VENIPUNCTURE: CPT

## 2021-07-28 PROCEDURE — 84153 ASSAY OF PSA TOTAL: CPT

## 2021-07-28 NOTE — TELEPHONE ENCOUNTER
Joao Lomeli  to Milind Tyler MD        7/28/21 1:53 AM  I will be having my blood drawn today for further testing but I have another issue. I have had a headache for about three weeks.  I have been talking acetaminophen two to three times a day but I

## 2021-07-28 NOTE — TELEPHONE ENCOUNTER
From: Augusto Turner  To: Martín Jaramillo MD  Sent: 7/28/2021 1:58 AM CDT  Subject: Other    Also these headaches seem to start just behind my left eye.  I also am experiencing sinus pressure

## 2021-07-28 NOTE — TELEPHONE ENCOUNTER
Since patient is also noting sinus congestion, this may be contributing to the headaches  Therefore I recommend that we start Flonase nasal spray 2 sprays each nostril daily consistently--this may take 2 to 3 days to kick in  Patient may continue with Tyle

## 2021-07-28 NOTE — TELEPHONE ENCOUNTER
From: José Miguel Power  To: Karuna Choudhury MD  Sent: 7/28/2021 1:53 AM CDT  Subject: Other    I will be having my blood drawn today for further testing but I have another issue. I have had a headache for about three weeks.  I have been talking acetaminophen t

## 2021-07-30 ENCOUNTER — TELEPHONE (OUTPATIENT)
Dept: INTERNAL MEDICINE CLINIC | Facility: CLINIC | Age: 61
End: 2021-07-30

## 2021-07-30 NOTE — TELEPHONE ENCOUNTER
Received a fax from [facility] with the patient's negative cologuard test results. Results placed in Dr AUSTIN.

## 2021-08-02 ENCOUNTER — PATIENT MESSAGE (OUTPATIENT)
Dept: INTERNAL MEDICINE CLINIC | Facility: CLINIC | Age: 61
End: 2021-08-02

## 2021-08-02 NOTE — TELEPHONE ENCOUNTER
From: Hari Mederos  To: Delanna Holter, MD  Sent: 8/2/2021 2:53 AM CDT  Subject: Other    I'm still having headache pain behind my left eye. This is keeping me from sleeping.  I'm using the Flonase and acetaminophen every 8 hours but not helping much anymo

## 2021-08-06 ENCOUNTER — OFFICE VISIT (OUTPATIENT)
Dept: INTERNAL MEDICINE CLINIC | Facility: CLINIC | Age: 61
End: 2021-08-06
Payer: COMMERCIAL

## 2021-08-06 VITALS
HEIGHT: 67 IN | HEART RATE: 59 BPM | TEMPERATURE: 98 F | BODY MASS INDEX: 33.8 KG/M2 | OXYGEN SATURATION: 99 % | DIASTOLIC BLOOD PRESSURE: 86 MMHG | SYSTOLIC BLOOD PRESSURE: 138 MMHG | WEIGHT: 215.38 LBS | RESPIRATION RATE: 16 BRPM

## 2021-08-06 DIAGNOSIS — N18.4 CKD (CHRONIC KIDNEY DISEASE) STAGE 4, GFR 15-29 ML/MIN (HCC): ICD-10-CM

## 2021-08-06 DIAGNOSIS — I25.10 CORONARY ARTERY DISEASE INVOLVING NATIVE HEART WITHOUT ANGINA PECTORIS, UNSPECIFIED VESSEL OR LESION TYPE: ICD-10-CM

## 2021-08-06 DIAGNOSIS — R51.9 NONINTRACTABLE HEADACHE, UNSPECIFIED CHRONICITY PATTERN, UNSPECIFIED HEADACHE TYPE: Primary | ICD-10-CM

## 2021-08-06 DIAGNOSIS — I10 ESSENTIAL HYPERTENSION: ICD-10-CM

## 2021-08-06 PROCEDURE — 3008F BODY MASS INDEX DOCD: CPT | Performed by: INTERNAL MEDICINE

## 2021-08-06 PROCEDURE — 3075F SYST BP GE 130 - 139MM HG: CPT | Performed by: INTERNAL MEDICINE

## 2021-08-06 PROCEDURE — 99213 OFFICE O/P EST LOW 20 MIN: CPT | Performed by: INTERNAL MEDICINE

## 2021-08-06 PROCEDURE — 3079F DIAST BP 80-89 MM HG: CPT | Performed by: INTERNAL MEDICINE

## 2021-08-06 NOTE — PROGRESS NOTES
HPI:    Patient ID: Grover Yadav is a 62year old male. Presents with c/o headaches x 3 wks. Headaches occur nearly on a daily basis.   Patient describes headaches as usually starting behind either eye and frequently progressed to frontal scalp area a MOUTH THREE TIMES A DAY AS NEEDED (Patient taking differently: 2 (two) times a day.  ) 90 tablet 3   • METOPROLOL TARTRATE 25 MG Oral Tab TAKE 1 TABLET BY MOUTH TWICE A  tablet 3   • Tadalafil 20 MG Oral Tab Take 1 tablet (20 mg total) by mouth kristen round, and reactive to light. Neck:      Comments: There is no tenderness to palpation over cervical spine. Cardiovascular:      Rate and Rhythm: Normal rate and regular rhythm. Heart sounds: Normal heart sounds.    Pulmonary:      Effort: Pulmonary his nephropathy and his kidney function has been holding stable. Patient will send me a progress report in about 7 to 10 days regarding his symptoms. No orders of the defined types were placed in this encounter.       Meds This Visit:  Requested

## 2021-09-14 RX ORDER — LOSARTAN POTASSIUM 100 MG/1
TABLET ORAL
Qty: 90 TABLET | Refills: 3 | Status: SHIPPED | OUTPATIENT
Start: 2021-09-14

## 2021-09-14 RX ORDER — ATORVASTATIN CALCIUM 80 MG/1
TABLET, FILM COATED ORAL
Qty: 90 TABLET | Refills: 3 | Status: SHIPPED | OUTPATIENT
Start: 2021-09-14

## 2021-09-14 RX ORDER — AMLODIPINE BESYLATE 10 MG/1
TABLET ORAL
Qty: 90 TABLET | Refills: 3 | Status: SHIPPED | OUTPATIENT
Start: 2021-09-14

## 2021-09-29 RX ORDER — FEBUXOSTAT 40 MG/1
TABLET, FILM COATED ORAL
Qty: 90 TABLET | Refills: 3 | Status: SHIPPED | OUTPATIENT
Start: 2021-09-29

## 2021-10-28 NOTE — TELEPHONE ENCOUNTER
To Dr. Elizabeht Menjivar to review upon return----    The above refill request is for a controlled substance. Please review pended medication order.     LOV: 8/6/21   : 6/18/21 #30  Last prescribed: #30 6/18/21

## 2021-10-31 RX ORDER — ALPRAZOLAM 0.25 MG/1
TABLET ORAL
Qty: 30 TABLET | Refills: 0 | Status: SHIPPED | OUTPATIENT
Start: 2021-10-31 | End: 2022-01-03

## 2021-12-17 ENCOUNTER — TELEPHONE (OUTPATIENT)
Dept: INTERNAL MEDICINE CLINIC | Facility: CLINIC | Age: 61
End: 2021-12-17

## 2021-12-19 ENCOUNTER — PATIENT MESSAGE (OUTPATIENT)
Dept: INTERNAL MEDICINE CLINIC | Facility: CLINIC | Age: 61
End: 2021-12-19

## 2021-12-19 RX ORDER — CLOPIDOGREL BISULFATE 75 MG/1
TABLET ORAL
Qty: 90 TABLET | Refills: 2 | OUTPATIENT
Start: 2021-12-19

## 2021-12-19 NOTE — TELEPHONE ENCOUNTER
To Mara Sales, please advise spironolactone refill    Refill request has failed the Ambulatory Medication Refill Standing Order and is routed to the primary physician to review the following:    Requested Prescriptions     Pending Prescriptions Disp Refills

## 2021-12-20 ENCOUNTER — PATIENT MESSAGE (OUTPATIENT)
Dept: INTERNAL MEDICINE CLINIC | Facility: CLINIC | Age: 61
End: 2021-12-20

## 2021-12-20 NOTE — TELEPHONE ENCOUNTER
From: Augusto Turner  To: Martín Jaramillo MD  Sent: 12/19/2021 11:20 AM CST  Subject: Refill inquiry     [facility] pharmacy tried calling you to refill my prescription for Clopidgrel 75mg and said you denied the request.Is this something I should not be taking.  P

## 2021-12-20 NOTE — TELEPHONE ENCOUNTER
I believe this was prescribed by patient's cardiologist Dr. Vince Hoff at time of his angioplasty. --Frequently this is continued for 6 months to 1 year. However some cardiologist prefer to keep it going longer than that.   Therefore patient should check with car

## 2021-12-20 NOTE — TELEPHONE ENCOUNTER
Please advise - patient is inquiring about his Plavix which was never prescribed by our office so it was declined for refill. Patient now wants to know if he should continue - to DR. ELLISON

## 2021-12-20 NOTE — TELEPHONE ENCOUNTER
[facility] message with DR. ELLISON message sent to patient

## 2021-12-21 RX ORDER — SPIRONOLACTONE 25 MG/1
TABLET ORAL
Qty: 45 TABLET | Refills: 3 | Status: SHIPPED | OUTPATIENT
Start: 2021-12-21

## 2021-12-21 NOTE — TELEPHONE ENCOUNTER
Reviewed and Rx done  Requested Prescriptions     Signed Prescriptions Disp Refills   • SPIRONOLACTONE 25 MG Oral Tab 45 tablet 3     Sig: TAKE 1/2 TABLET BY MOUTH EVERY DAY     Authorizing Provider: Octavia Leventhal     Ordering User: Karen Abraham

## 2022-01-03 ENCOUNTER — TELEPHONE (OUTPATIENT)
Dept: INTERNAL MEDICINE CLINIC | Facility: CLINIC | Age: 62
End: 2022-01-03

## 2022-01-03 RX ORDER — ALPRAZOLAM 0.25 MG/1
TABLET ORAL
Qty: 30 TABLET | Refills: 0 | Status: SHIPPED | OUTPATIENT
Start: 2022-01-03

## 2022-01-03 NOTE — TELEPHONE ENCOUNTER
To MD:  The above refill request is for a controlled substance. Please review pended medication order. Print and sign for staff to fax to pharmacy or prescribe electronically.     Last office visit: 8/6/21  Last time refill sent and quantity/refills: 10/
yes...

## 2022-01-20 ENCOUNTER — PATIENT MESSAGE (OUTPATIENT)
Dept: INTERNAL MEDICINE CLINIC | Facility: CLINIC | Age: 62
End: 2022-01-20

## 2022-01-20 NOTE — TELEPHONE ENCOUNTER
From: Marina Banegas  To: Jerry Burnett MD  Sent: 1/20/2022 2:09 AM CST  Subject: Covid    Tonight after work I developed a runny nose sneezing and a slight sore throat. Should I be tested and where should I go to be tested?

## 2022-01-22 LAB — AMB EXT COVID-19 RESULT: DETECTED

## 2022-01-23 NOTE — TELEPHONE ENCOUNTER
Called pt today he did go to [facility] local [facility] and get tested yesterday however will not have results for a couple days. States sore throat has improved, denies and SOB, chest pain/pressure. Advised to call us with results.

## 2022-01-23 NOTE — TELEPHONE ENCOUNTER
Regarding: Covid  ----- Message from Orin Neal RN sent at 1/20/2022  6:48 AM CST -----       ----- Message sent from Brendia Riedel, RN to [facility] at 1/20/2022  6:47 AM -----   Hi Marcelo Kingston for your symptoms.  Yes, it would be

## 2022-01-24 ENCOUNTER — PATIENT MESSAGE (OUTPATIENT)
Dept: INTERNAL MEDICINE CLINIC | Facility: CLINIC | Age: 62
End: 2022-01-24

## 2022-01-24 NOTE — TELEPHONE ENCOUNTER
Left message to call back. When did he test positive .  We can send home care and isolation instructions via [facility]

## 2022-01-24 NOTE — TELEPHONE ENCOUNTER
Patient is calling back. Patient tested positive on Saturday, 1/22/2022. Patient informed  he did have a runny nose and sneezing but they have since stopped. Patient has no other symptoms. Patient is fully vaccinated and boosted.      Patient conf

## 2022-03-14 ENCOUNTER — TELEPHONE (OUTPATIENT)
Dept: INTERNAL MEDICINE CLINIC | Facility: CLINIC | Age: 62
End: 2022-03-14

## 2022-03-14 RX ORDER — ALPRAZOLAM 0.25 MG/1
TABLET ORAL
Qty: 30 TABLET | Refills: 0 | Status: SHIPPED | OUTPATIENT
Start: 2022-03-14

## 2022-03-14 NOTE — TELEPHONE ENCOUNTER
To MD:  The above refill request is for a controlled substance. Please review pended medication order. Print and sign for staff to fax to pharmacy or prescribe electronically.     Last office visit: 8/6/2021  Last time refill sent and quantity/refills: 1/3/2022 #30

## 2022-03-30 ENCOUNTER — TELEPHONE (OUTPATIENT)
Dept: INTERNAL MEDICINE CLINIC | Facility: CLINIC | Age: 62
End: 2022-03-30

## 2022-03-30 NOTE — TELEPHONE ENCOUNTER
Spoke with patient who has noticed he has been more fatigued lately. He tells me he is OK during the day but really feels really exhausted at around 9 or 10pm. He does work a PM shift so he is usually getting home around this time. He sleeps okay most of the time. He tells me he has been having left arm pain that has been ongoing for years. He sees a chiropractor who had \"worked out\" most of the pain. However, he had a fall last week and he thinks that is causing him more pain. He was holding on to his wife's arm and, when she started to fall, she pulled him down with her by his left arm. He denies hitting his head or any other injuries from fall, only has a little bruise on his leg. Left arm pain is intermittent and seems worse when he is at rest. He doesn't really notice it as much at work. He would describe the pain as a burning sensation. Pain goes from left shoulder to left elbow. At the very worst it gets to a 7/10. He will occasionally take tylenol but most times does not take anything. He mentions he also gets chest pain a few times a week. He tells me this has also been ongoing and has been discussed at past appointments. He informs me he does have a history of a heart attack. Pain happens on the right side of his chest. Not sharp, just a dull pain that lingers for a little while and then subsides. TO Dr. Anais Renee-- Helio Jacobo for patient to wait until 4/5 to be seen?

## 2022-03-30 NOTE — TELEPHONE ENCOUNTER
Pt scheduled mychart appt for 4/5/22 for the following:    \"Fatigue and left arm pain\"    [id number] Jaelyn Baez for patient to wait to be seen?     Tasked to nursing

## 2022-04-05 ENCOUNTER — LAB ENCOUNTER (OUTPATIENT)
Dept: LAB | Age: 62
End: 2022-04-05
Attending: INTERNAL MEDICINE
Payer: COMMERCIAL

## 2022-04-05 ENCOUNTER — OFFICE VISIT (OUTPATIENT)
Dept: INTERNAL MEDICINE CLINIC | Facility: CLINIC | Age: 62
End: 2022-04-05
Payer: COMMERCIAL

## 2022-04-05 VITALS
HEIGHT: 67 IN | DIASTOLIC BLOOD PRESSURE: 70 MMHG | BODY MASS INDEX: 32.96 KG/M2 | SYSTOLIC BLOOD PRESSURE: 128 MMHG | OXYGEN SATURATION: 95 % | RESPIRATION RATE: 18 BRPM | HEART RATE: 54 BPM | WEIGHT: 210 LBS

## 2022-04-05 DIAGNOSIS — E78.00 PURE HYPERCHOLESTEROLEMIA: ICD-10-CM

## 2022-04-05 DIAGNOSIS — R53.83 FATIGUE, UNSPECIFIED TYPE: ICD-10-CM

## 2022-04-05 DIAGNOSIS — R53.83 FATIGUE, UNSPECIFIED TYPE: Primary | ICD-10-CM

## 2022-04-05 DIAGNOSIS — I10 ESSENTIAL HYPERTENSION: ICD-10-CM

## 2022-04-05 DIAGNOSIS — M25.512 LEFT SHOULDER PAIN, UNSPECIFIED CHRONICITY: ICD-10-CM

## 2022-04-05 DIAGNOSIS — N18.4 CKD (CHRONIC KIDNEY DISEASE) STAGE 4, GFR 15-29 ML/MIN (HCC): ICD-10-CM

## 2022-04-05 DIAGNOSIS — I25.10 CORONARY ARTERY DISEASE INVOLVING NATIVE HEART WITHOUT ANGINA PECTORIS, UNSPECIFIED VESSEL OR LESION TYPE: ICD-10-CM

## 2022-04-05 LAB
ALBUMIN SERPL-MCNC: 3.9 G/DL (ref 3.4–5)
ALBUMIN/GLOB SERPL: 1.1 {RATIO} (ref 1–2)
ALP LIVER SERPL-CCNC: 76 U/L
ALT SERPL-CCNC: 46 U/L
ANION GAP SERPL CALC-SCNC: 4 MMOL/L (ref 0–18)
AST SERPL-CCNC: 24 U/L (ref 15–37)
BASOPHILS # BLD AUTO: 0.02 X10(3) UL (ref 0–0.2)
BASOPHILS NFR BLD AUTO: 0.3 %
BILIRUB SERPL-MCNC: 0.6 MG/DL (ref 0.1–2)
BUN BLD-MCNC: 39 MG/DL (ref 7–18)
BUN/CREAT SERPL: 17 (ref 10–20)
CALCIUM BLD-MCNC: 9.6 MG/DL (ref 8.5–10.1)
CHLORIDE SERPL-SCNC: 110 MMOL/L (ref 98–112)
CO2 SERPL-SCNC: 26 MMOL/L (ref 21–32)
CREAT BLD-MCNC: 2.3 MG/DL
DEPRECATED RDW RBC AUTO: 47.9 FL (ref 35.1–46.3)
EOSINOPHIL # BLD AUTO: 0.33 X10(3) UL (ref 0–0.7)
EOSINOPHIL NFR BLD AUTO: 5.7 %
ERYTHROCYTE [DISTWIDTH] IN BLOOD BY AUTOMATED COUNT: 13.3 % (ref 11–15)
FASTING STATUS PATIENT QL REPORTED: NO
GLOBULIN PLAS-MCNC: 3.7 G/DL (ref 2.8–4.4)
GLUCOSE BLD-MCNC: 82 MG/DL (ref 70–99)
HCT VFR BLD AUTO: 46.3 %
HGB BLD-MCNC: 14.9 G/DL
IMM GRANULOCYTES # BLD AUTO: 0.03 X10(3) UL (ref 0–1)
IMM GRANULOCYTES NFR BLD: 0.5 %
LYMPHOCYTES # BLD AUTO: 1.55 X10(3) UL (ref 1–4)
LYMPHOCYTES NFR BLD AUTO: 26.8 %
MCH RBC QN AUTO: 31.4 PG (ref 26–34)
MCHC RBC AUTO-ENTMCNC: 32.2 G/DL (ref 31–37)
MCV RBC AUTO: 97.5 FL
MONOCYTES # BLD AUTO: 0.63 X10(3) UL (ref 0.1–1)
MONOCYTES NFR BLD AUTO: 10.9 %
NEUTROPHILS # BLD AUTO: 3.23 X10 (3) UL (ref 1.5–7.7)
NEUTROPHILS # BLD AUTO: 3.23 X10(3) UL (ref 1.5–7.7)
NEUTROPHILS NFR BLD AUTO: 55.8 %
OSMOLALITY SERPL CALC.SUM OF ELEC: 298 MOSM/KG (ref 275–295)
PLATELET # BLD AUTO: 163 10(3)UL (ref 150–450)
POTASSIUM SERPL-SCNC: 5.2 MMOL/L (ref 3.5–5.1)
PROT SERPL-MCNC: 7.6 G/DL (ref 6.4–8.2)
RBC # BLD AUTO: 4.75 X10(6)UL
SODIUM SERPL-SCNC: 140 MMOL/L (ref 136–145)
T4 FREE SERPL-MCNC: 0.8 NG/DL (ref 0.8–1.7)
TSI SER-ACNC: 2.29 MIU/ML (ref 0.36–3.74)
WBC # BLD AUTO: 5.8 X10(3) UL (ref 4–11)

## 2022-04-05 PROCEDURE — 3078F DIAST BP <80 MM HG: CPT | Performed by: INTERNAL MEDICINE

## 2022-04-05 PROCEDURE — 84443 ASSAY THYROID STIM HORMONE: CPT

## 2022-04-05 PROCEDURE — 3074F SYST BP LT 130 MM HG: CPT | Performed by: INTERNAL MEDICINE

## 2022-04-05 PROCEDURE — 84439 ASSAY OF FREE THYROXINE: CPT

## 2022-04-05 PROCEDURE — 36415 COLL VENOUS BLD VENIPUNCTURE: CPT

## 2022-04-05 PROCEDURE — 80053 COMPREHEN METABOLIC PANEL: CPT

## 2022-04-05 PROCEDURE — 85025 COMPLETE CBC W/AUTO DIFF WBC: CPT

## 2022-04-05 PROCEDURE — 99213 OFFICE O/P EST LOW 20 MIN: CPT | Performed by: INTERNAL MEDICINE

## 2022-04-05 PROCEDURE — 3008F BODY MASS INDEX DOCD: CPT | Performed by: INTERNAL MEDICINE

## 2022-04-13 ENCOUNTER — MED REC SCAN ONLY (OUTPATIENT)
Dept: INTERNAL MEDICINE CLINIC | Facility: CLINIC | Age: 62
End: 2022-04-13

## 2022-05-11 ENCOUNTER — MED REC SCAN ONLY (OUTPATIENT)
Dept: INTERNAL MEDICINE CLINIC | Facility: CLINIC | Age: 62
End: 2022-05-11

## 2022-06-02 ENCOUNTER — TELEPHONE (OUTPATIENT)
Dept: INTERNAL MEDICINE CLINIC | Facility: CLINIC | Age: 62
End: 2022-06-02

## 2022-06-03 RX ORDER — ALPRAZOLAM 0.25 MG/1
TABLET ORAL
Qty: 30 TABLET | Refills: 0 | Status: SHIPPED | OUTPATIENT
Start: 2022-06-03

## 2022-06-03 NOTE — TELEPHONE ENCOUNTER
To MD:  The above refill request is for a controlled substance. Please review pended medication order. Print and sign for staff to fax to pharmacy or prescribe electronically.     Last office visit: 4/5/22  Last time refill sent and quantity/refills: 3/14/22 qty 30 plus 0

## 2022-06-24 RX ORDER — MECLIZINE HYDROCHLORIDE 25 MG/1
TABLET ORAL
Qty: 90 TABLET | Refills: 3 | Status: SHIPPED | OUTPATIENT
Start: 2022-06-24

## 2022-07-06 ENCOUNTER — LAB ENCOUNTER (OUTPATIENT)
Dept: LAB | Age: 62
End: 2022-07-06
Attending: INTERNAL MEDICINE
Payer: COMMERCIAL

## 2022-07-06 ENCOUNTER — OFFICE VISIT (OUTPATIENT)
Dept: INTERNAL MEDICINE CLINIC | Facility: CLINIC | Age: 62
End: 2022-07-06
Payer: COMMERCIAL

## 2022-07-06 VITALS
BODY MASS INDEX: 32.74 KG/M2 | HEIGHT: 67 IN | WEIGHT: 208.63 LBS | HEART RATE: 53 BPM | TEMPERATURE: 98 F | OXYGEN SATURATION: 95 % | DIASTOLIC BLOOD PRESSURE: 76 MMHG | SYSTOLIC BLOOD PRESSURE: 126 MMHG

## 2022-07-06 DIAGNOSIS — E78.00 PURE HYPERCHOLESTEROLEMIA: ICD-10-CM

## 2022-07-06 DIAGNOSIS — I10 ESSENTIAL HYPERTENSION: ICD-10-CM

## 2022-07-06 DIAGNOSIS — Z12.9 CANCER SCREENING: ICD-10-CM

## 2022-07-06 DIAGNOSIS — Z00.00 PHYSICAL EXAM: Primary | ICD-10-CM

## 2022-07-06 DIAGNOSIS — N18.4 CKD (CHRONIC KIDNEY DISEASE) STAGE 4, GFR 15-29 ML/MIN (HCC): ICD-10-CM

## 2022-07-06 DIAGNOSIS — I25.10 CORONARY ARTERY DISEASE INVOLVING NATIVE HEART WITHOUT ANGINA PECTORIS, UNSPECIFIED VESSEL OR LESION TYPE: ICD-10-CM

## 2022-07-06 DIAGNOSIS — M10.9 GOUT, UNSPECIFIED CAUSE, UNSPECIFIED CHRONICITY, UNSPECIFIED SITE: ICD-10-CM

## 2022-07-06 DIAGNOSIS — N02.8 IGA NEPHROPATHY: ICD-10-CM

## 2022-07-06 PROBLEM — N02.B9 IGA NEPHROPATHY: Status: ACTIVE | Noted: 2022-07-06

## 2022-07-06 PROCEDURE — 99396 PREV VISIT EST AGE 40-64: CPT | Performed by: INTERNAL MEDICINE

## 2022-07-06 PROCEDURE — 3074F SYST BP LT 130 MM HG: CPT | Performed by: INTERNAL MEDICINE

## 2022-07-06 PROCEDURE — 3008F BODY MASS INDEX DOCD: CPT | Performed by: INTERNAL MEDICINE

## 2022-07-06 PROCEDURE — 3078F DIAST BP <80 MM HG: CPT | Performed by: INTERNAL MEDICINE

## 2022-07-07 DIAGNOSIS — N28.9 ACUTE RENAL INSUFFICIENCY: Primary | ICD-10-CM

## 2022-07-11 ENCOUNTER — TELEPHONE (OUTPATIENT)
Dept: INTERNAL MEDICINE CLINIC | Facility: CLINIC | Age: 62
End: 2022-07-11

## 2022-07-21 ENCOUNTER — LAB ENCOUNTER (OUTPATIENT)
Dept: LAB | Age: 62
End: 2022-07-21
Attending: INTERNAL MEDICINE
Payer: COMMERCIAL

## 2022-07-21 DIAGNOSIS — N28.9 ACUTE RENAL INSUFFICIENCY: ICD-10-CM

## 2022-07-21 LAB
ANION GAP SERPL CALC-SCNC: 6 MMOL/L (ref 0–18)
BUN BLD-MCNC: 36 MG/DL (ref 7–18)
BUN/CREAT SERPL: 15.8 (ref 10–20)
CALCIUM BLD-MCNC: 9.2 MG/DL (ref 8.5–10.1)
CHLORIDE SERPL-SCNC: 110 MMOL/L (ref 98–112)
CO2 SERPL-SCNC: 24 MMOL/L (ref 21–32)
CREAT BLD-MCNC: 2.28 MG/DL
FASTING STATUS PATIENT QL REPORTED: NO
GLUCOSE BLD-MCNC: 85 MG/DL (ref 70–99)
OSMOLALITY SERPL CALC.SUM OF ELEC: 298 MOSM/KG (ref 275–295)
POTASSIUM SERPL-SCNC: 4.7 MMOL/L (ref 3.5–5.1)
SODIUM SERPL-SCNC: 140 MMOL/L (ref 136–145)
URATE SERPL-MCNC: 4.8 MG/DL

## 2022-07-21 PROCEDURE — 36415 COLL VENOUS BLD VENIPUNCTURE: CPT

## 2022-07-21 PROCEDURE — 84550 ASSAY OF BLOOD/URIC ACID: CPT

## 2022-07-21 PROCEDURE — 80048 BASIC METABOLIC PNL TOTAL CA: CPT

## 2022-09-07 RX ORDER — AMLODIPINE BESYLATE 10 MG/1
TABLET ORAL
Qty: 90 TABLET | Refills: 3 | Status: SHIPPED | OUTPATIENT
Start: 2022-09-07

## 2022-09-07 RX ORDER — LOSARTAN POTASSIUM 100 MG/1
TABLET ORAL
Qty: 90 TABLET | Refills: 3 | Status: SHIPPED | OUTPATIENT
Start: 2022-09-07

## 2022-09-07 RX ORDER — ATORVASTATIN CALCIUM 80 MG/1
TABLET, FILM COATED ORAL
Qty: 90 TABLET | Refills: 3 | Status: SHIPPED | OUTPATIENT
Start: 2022-09-07

## 2022-10-02 RX ORDER — FEBUXOSTAT 40 MG/1
TABLET, FILM COATED ORAL
Qty: 90 TABLET | Refills: 3 | Status: SHIPPED | OUTPATIENT
Start: 2022-10-02

## 2022-10-24 ENCOUNTER — PATIENT MESSAGE (OUTPATIENT)
Dept: INTERNAL MEDICINE CLINIC | Facility: CLINIC | Age: 62
End: 2022-10-24

## 2022-10-25 NOTE — TELEPHONE ENCOUNTER
If this is for evaluation of sleep apnea, then I doubt patient can get in any sooner at [facility] require a sleep study even before seeing a pulmonologist.

## 2022-10-27 NOTE — TELEPHONE ENCOUNTER
S/w patient. He reports appt on 12/6 is with [facility], which is an ENT medical practice in [facility]. I informed him of MDs message. He opts to keep appt with [facility].

## 2022-11-19 ENCOUNTER — TELEPHONE (OUTPATIENT)
Dept: INTERNAL MEDICINE CLINIC | Facility: CLINIC | Age: 62
End: 2022-11-19

## 2022-11-21 RX ORDER — ALPRAZOLAM 0.25 MG/1
TABLET ORAL
Qty: 30 TABLET | Refills: 5 | Status: SHIPPED | OUTPATIENT
Start: 2022-11-21

## 2022-11-21 NOTE — TELEPHONE ENCOUNTER
To MD:  The above refill request is for a controlled substance. Please review pended medication order. Print and sign for staff to fax to pharmacy or prescribe electronically.     Last office visit: 7/6/20220   Last time refill sent and quantity/refills: 6/3/2022 # 30 /0`  [place] 6[place]20[place] # 30

## 2022-12-20 ENCOUNTER — MED REC SCAN ONLY (OUTPATIENT)
Dept: INTERNAL MEDICINE CLINIC | Facility: CLINIC | Age: 62
End: 2022-12-20

## 2023-01-09 ENCOUNTER — OFFICE VISIT (OUTPATIENT)
Dept: INTERNAL MEDICINE CLINIC | Facility: CLINIC | Age: 63
End: 2023-01-09
Payer: COMMERCIAL

## 2023-01-09 VITALS
HEIGHT: 67 IN | BODY MASS INDEX: 33.27 KG/M2 | TEMPERATURE: 98 F | OXYGEN SATURATION: 95 % | DIASTOLIC BLOOD PRESSURE: 72 MMHG | HEART RATE: 69 BPM | WEIGHT: 212 LBS | SYSTOLIC BLOOD PRESSURE: 128 MMHG

## 2023-01-09 DIAGNOSIS — Z23 NEED FOR VACCINATION: ICD-10-CM

## 2023-01-09 DIAGNOSIS — I10 ESSENTIAL HYPERTENSION: ICD-10-CM

## 2023-01-09 DIAGNOSIS — Z12.9 CANCER SCREENING: ICD-10-CM

## 2023-01-09 DIAGNOSIS — I25.10 CORONARY ARTERY DISEASE INVOLVING NATIVE HEART WITHOUT ANGINA PECTORIS, UNSPECIFIED VESSEL OR LESION TYPE: Primary | ICD-10-CM

## 2023-01-09 DIAGNOSIS — M10.9 GOUT, UNSPECIFIED CAUSE, UNSPECIFIED CHRONICITY, UNSPECIFIED SITE: ICD-10-CM

## 2023-01-09 DIAGNOSIS — E78.00 PURE HYPERCHOLESTEROLEMIA: ICD-10-CM

## 2023-01-09 DIAGNOSIS — N02.8 IGA NEPHROPATHY: ICD-10-CM

## 2023-01-09 PROCEDURE — 90686 IIV4 VACC NO PRSV 0.5 ML IM: CPT | Performed by: INTERNAL MEDICINE

## 2023-01-09 PROCEDURE — 99213 OFFICE O/P EST LOW 20 MIN: CPT | Performed by: INTERNAL MEDICINE

## 2023-01-09 PROCEDURE — 3074F SYST BP LT 130 MM HG: CPT | Performed by: INTERNAL MEDICINE

## 2023-01-09 PROCEDURE — 90471 IMMUNIZATION ADMIN: CPT | Performed by: INTERNAL MEDICINE

## 2023-01-09 PROCEDURE — 3008F BODY MASS INDEX DOCD: CPT | Performed by: INTERNAL MEDICINE

## 2023-01-09 PROCEDURE — 3078F DIAST BP <80 MM HG: CPT | Performed by: INTERNAL MEDICINE

## 2023-01-09 RX ORDER — METOPROLOL SUCCINATE 25 MG/1
50 TABLET, EXTENDED RELEASE ORAL DAILY
Qty: 90 TABLET | Refills: 3 | Status: SHIPPED | OUTPATIENT
Start: 2023-01-09 | End: 2024-01-09

## 2023-02-16 ENCOUNTER — PATIENT MESSAGE (OUTPATIENT)
Dept: INTERNAL MEDICINE CLINIC | Facility: CLINIC | Age: 63
End: 2023-02-16

## 2023-02-17 NOTE — TELEPHONE ENCOUNTER
cologuard requisition was faxed in January - patient never received anything refaxed with face sheet, also relayed [facility] message and he verbalized understanding

## 2023-02-17 NOTE — TELEPHONE ENCOUNTER
Can we check on this to see if Cologuard order was sent--if not, please send it so that patient can do the Cologuard test.    Regarding the question about holding his blood thinners--patient has significant heart disease and would need to get cardiologist approval to hold these medications  Patient should therefore direct his question toward his cardiologist.

## 2023-03-02 NOTE — TELEPHONE ENCOUNTER
LMTCB verify med and pharmacy. Manish Villalpando MD   to [facility] Clinical Staff     12/12/19  Note      Blood pressure is too high. Patient is currently taking metoprolol 12.5 mg p.o. twice daily  He should increase this to 25 mg p.o. twice daily.   C No

## 2023-06-06 ENCOUNTER — TELEPHONE (OUTPATIENT)
Dept: INTERNAL MEDICINE CLINIC | Facility: CLINIC | Age: 63
End: 2023-06-06

## 2023-06-06 NOTE — TELEPHONE ENCOUNTER
Spoke with patient states he fell from a ladder on Sunday. Patient states he fell about 10-12 feet, most Impact to right leg and knee. Denies hitting his head, LOC, bleeding or bruising anywhere else. Patient has laceration lower right leg below his knee. Bleeding controlled. Patient currently on Plavix. Advised UC/ED for evaluation of laceration and well as due to the height from which patient fell. Patient states he is not able to go today but will go to [facility] tomorrow, because he has a work issue today. Advised ED with bleeding, increased bruising with new tenderness to the touch anywhere on the body, especially abdominal swelling or tightness, confusion, nausea, vomiting. Patient verbalized understanding.  Nursing to F/U 6/8

## 2023-06-06 NOTE — TELEPHONE ENCOUNTER
Received voicemail message from patient stating he recently fell off a ladder and wanted to see Dr Anais Renee tomorrow or the next day so he could check a gash in his leg    Please call to triage patient     call back #[phone]

## 2023-06-08 NOTE — TELEPHONE ENCOUNTER
Spoke with patient was seen at [facility] at [facility] and [address]. No broken bones. Sutures placed to be removed in 10 days at the same facility. Patient to call back with any questions or concerns.

## 2023-07-22 DIAGNOSIS — E78.00 PURE HYPERCHOLESTEROLEMIA: ICD-10-CM

## 2023-07-22 DIAGNOSIS — I25.10 CORONARY ARTERY DISEASE INVOLVING NATIVE HEART WITHOUT ANGINA PECTORIS, UNSPECIFIED VESSEL OR LESION TYPE: ICD-10-CM

## 2023-07-24 RX ORDER — METOPROLOL SUCCINATE 25 MG/1
50 TABLET, EXTENDED RELEASE ORAL DAILY
Qty: 180 TABLET | Refills: 0 | Status: SHIPPED | OUTPATIENT
Start: 2023-07-24

## 2023-07-24 NOTE — TELEPHONE ENCOUNTER
90 day sent. Annual physical scheduled 8/23. Refill request is for a maintenance medication and has met the criteria specified in the Ambulatory Medication Refill Standing Order for eligibility, visits, laboratory, alerts and was sent to the requested pharmacy.     Requested Prescriptions     Signed Prescriptions Disp Refills    metoprolol succinate ER 25 MG Oral Tablet 24 Hr 180 tablet 0     Sig: TAKE 2 TABLETS BY MOUTH EVERY DAY     Authorizing Provider: Conchita Escamilla     Ordering User: Nik Diaz

## 2023-07-25 RX ORDER — ATORVASTATIN CALCIUM 80 MG/1
80 TABLET, FILM COATED ORAL DAILY
Qty: 90 TABLET | Refills: 3 | Status: SHIPPED | OUTPATIENT
Start: 2023-07-25

## 2023-07-25 RX ORDER — AMLODIPINE BESYLATE 10 MG/1
10 TABLET ORAL DAILY
Qty: 90 TABLET | Refills: 3 | Status: SHIPPED | OUTPATIENT
Start: 2023-07-25

## 2023-07-25 RX ORDER — LOSARTAN POTASSIUM 100 MG/1
100 TABLET ORAL DAILY
Qty: 90 TABLET | Refills: 3 | Status: SHIPPED | OUTPATIENT
Start: 2023-07-25

## 2023-07-25 NOTE — TELEPHONE ENCOUNTER
Refill request is for a maintenance medication and has met the criteria specified in the Ambulatory Medication Refill Standing Order for eligibility, visits, laboratory, alerts and was sent to the requested pharmacy. Requested Prescriptions     Signed Prescriptions Disp Refills    atorvastatin 80 MG Oral Tab 90 tablet 3     Sig: Take 1 tablet (80 mg total) by mouth daily. Authorizing Provider: Camilla Wesley     Ordering User: Lissett Shelton    losartan 100 MG Oral Tab 90 tablet 3     Sig: Take 1 tablet (100 mg total) by mouth daily. Authorizing Provider: Camilla Wesley     Ordering User: Lissett Shelton    amLODIPine 10 MG Oral Tab 90 tablet 3     Sig: Take 1 tablet (10 mg total) by mouth daily.      Authorizing Provider: Camilla Wesley     Ordering User: Lissett Shelton

## 2023-08-18 ENCOUNTER — TELEPHONE (OUTPATIENT)
Dept: INTERNAL MEDICINE CLINIC | Facility: CLINIC | Age: 63
End: 2023-08-18

## 2023-08-18 RX ORDER — ALPRAZOLAM 0.25 MG/1
TABLET ORAL
Qty: 30 TABLET | Refills: 5 | Status: SHIPPED | OUTPATIENT
Start: 2023-08-18

## 2023-08-18 NOTE — TELEPHONE ENCOUNTER
To MD:  The above refill request is for a controlled substance. Please review pended medication order. Print and sign for staff to fax to pharmacy or prescribe electronically.     Last office visit:  1/9/23  Last time refill sent and quantity/refills: 11/21/22 qty 30 plus 5

## 2023-08-23 ENCOUNTER — OFFICE VISIT (OUTPATIENT)
Dept: INTERNAL MEDICINE CLINIC | Facility: CLINIC | Age: 63
End: 2023-08-23

## 2023-08-23 ENCOUNTER — LAB ENCOUNTER (OUTPATIENT)
Dept: LAB | Age: 63
End: 2023-08-23
Attending: INTERNAL MEDICINE
Payer: COMMERCIAL

## 2023-08-23 VITALS
HEIGHT: 67 IN | DIASTOLIC BLOOD PRESSURE: 84 MMHG | HEART RATE: 72 BPM | OXYGEN SATURATION: 98 % | WEIGHT: 213 LBS | SYSTOLIC BLOOD PRESSURE: 138 MMHG | BODY MASS INDEX: 33.43 KG/M2

## 2023-08-23 DIAGNOSIS — Z00.00 PHYSICAL EXAM: Primary | ICD-10-CM

## 2023-08-23 DIAGNOSIS — I25.10 CORONARY ARTERY DISEASE INVOLVING NATIVE HEART WITHOUT ANGINA PECTORIS, UNSPECIFIED VESSEL OR LESION TYPE: ICD-10-CM

## 2023-08-23 DIAGNOSIS — M10.9 GOUT, UNSPECIFIED CAUSE, UNSPECIFIED CHRONICITY, UNSPECIFIED SITE: ICD-10-CM

## 2023-08-23 DIAGNOSIS — E78.00 PURE HYPERCHOLESTEROLEMIA: ICD-10-CM

## 2023-08-23 DIAGNOSIS — I10 ESSENTIAL HYPERTENSION: ICD-10-CM

## 2023-08-23 DIAGNOSIS — N02.8 IGA NEPHROPATHY: ICD-10-CM

## 2023-08-23 DIAGNOSIS — N40.0 BENIGN NON-NODULAR PROSTATIC HYPERPLASIA WITHOUT LOWER URINARY TRACT SYMPTOMS: ICD-10-CM

## 2023-08-23 DIAGNOSIS — Z00.00 PHYSICAL EXAM: ICD-10-CM

## 2023-08-23 DIAGNOSIS — N18.4 CKD (CHRONIC KIDNEY DISEASE) STAGE 4, GFR 15-29 ML/MIN (HCC): ICD-10-CM

## 2023-08-23 LAB
ALBUMIN SERPL-MCNC: 3.6 G/DL (ref 3.4–5)
ALBUMIN/GLOB SERPL: 0.9 {RATIO} (ref 1–2)
ALP LIVER SERPL-CCNC: 87 U/L
ALT SERPL-CCNC: 40 U/L
ANION GAP SERPL CALC-SCNC: 4 MMOL/L (ref 0–18)
AST SERPL-CCNC: 22 U/L (ref 15–37)
BASOPHILS # BLD AUTO: 0.03 X10(3) UL (ref 0–0.2)
BASOPHILS NFR BLD AUTO: 0.6 %
BILIRUB SERPL-MCNC: 0.4 MG/DL (ref 0.1–2)
BUN BLD-MCNC: 30 MG/DL (ref 7–18)
BUN/CREAT SERPL: 12.9 (ref 10–20)
CALCIUM BLD-MCNC: 9.6 MG/DL (ref 8.5–10.1)
CHLORIDE SERPL-SCNC: 113 MMOL/L (ref 98–112)
CHOLEST SERPL-MCNC: 121 MG/DL (ref ?–200)
CO2 SERPL-SCNC: 27 MMOL/L (ref 21–32)
COMPLEXED PSA SERPL-MCNC: 5.96 NG/ML (ref ?–4)
CREAT BLD-MCNC: 2.33 MG/DL
DEPRECATED RDW RBC AUTO: 45.5 FL (ref 35.1–46.3)
EGFRCR SERPLBLD CKD-EPI 2021: 31 ML/MIN/1.73M2 (ref 60–?)
EOSINOPHIL # BLD AUTO: 0.25 X10(3) UL (ref 0–0.7)
EOSINOPHIL NFR BLD AUTO: 4.7 %
ERYTHROCYTE [DISTWIDTH] IN BLOOD BY AUTOMATED COUNT: 13 % (ref 11–15)
EST. AVERAGE GLUCOSE BLD GHB EST-MCNC: 108 MG/DL (ref 68–126)
FASTING PATIENT LIPID ANSWER: NO
FASTING STATUS PATIENT QL REPORTED: NO
GLOBULIN PLAS-MCNC: 4.2 G/DL (ref 2.8–4.4)
GLUCOSE BLD-MCNC: 82 MG/DL (ref 70–99)
HBA1C MFR BLD: 5.4 % (ref ?–5.7)
HCT VFR BLD AUTO: 44.2 %
HDLC SERPL-MCNC: 39 MG/DL (ref 40–59)
HGB BLD-MCNC: 14.8 G/DL
IMM GRANULOCYTES # BLD AUTO: 0.03 X10(3) UL (ref 0–1)
IMM GRANULOCYTES NFR BLD: 0.6 %
LDLC SERPL CALC-MCNC: 58 MG/DL (ref ?–100)
LYMPHOCYTES # BLD AUTO: 1.52 X10(3) UL (ref 1–4)
LYMPHOCYTES NFR BLD AUTO: 28.7 %
MCH RBC QN AUTO: 31.8 PG (ref 26–34)
MCHC RBC AUTO-ENTMCNC: 33.5 G/DL (ref 31–37)
MCV RBC AUTO: 94.8 FL
MONOCYTES # BLD AUTO: 0.6 X10(3) UL (ref 0.1–1)
MONOCYTES NFR BLD AUTO: 11.3 %
NEUTROPHILS # BLD AUTO: 2.86 X10 (3) UL (ref 1.5–7.7)
NEUTROPHILS # BLD AUTO: 2.86 X10(3) UL (ref 1.5–7.7)
NEUTROPHILS NFR BLD AUTO: 54.1 %
NONHDLC SERPL-MCNC: 82 MG/DL (ref ?–130)
OSMOLALITY SERPL CALC.SUM OF ELEC: 303 MOSM/KG (ref 275–295)
PLATELET # BLD AUTO: 159 10(3)UL (ref 150–450)
POTASSIUM SERPL-SCNC: 4.1 MMOL/L (ref 3.5–5.1)
PROT SERPL-MCNC: 7.8 G/DL (ref 6.4–8.2)
RBC # BLD AUTO: 4.66 X10(6)UL
SODIUM SERPL-SCNC: 144 MMOL/L (ref 136–145)
TRIGL SERPL-MCNC: 136 MG/DL (ref 30–149)
TSI SER-ACNC: 3.1 MIU/ML (ref 0.36–3.74)
URATE SERPL-MCNC: 5.6 MG/DL
VLDLC SERPL CALC-MCNC: 20 MG/DL (ref 0–30)
WBC # BLD AUTO: 5.3 X10(3) UL (ref 4–11)

## 2023-08-23 PROCEDURE — 85025 COMPLETE CBC W/AUTO DIFF WBC: CPT

## 2023-08-23 PROCEDURE — 83036 HEMOGLOBIN GLYCOSYLATED A1C: CPT

## 2023-08-23 PROCEDURE — 84550 ASSAY OF BLOOD/URIC ACID: CPT

## 2023-08-23 PROCEDURE — 90472 IMMUNIZATION ADMIN EACH ADD: CPT | Performed by: INTERNAL MEDICINE

## 2023-08-23 PROCEDURE — 90471 IMMUNIZATION ADMIN: CPT | Performed by: INTERNAL MEDICINE

## 2023-08-23 PROCEDURE — 3008F BODY MASS INDEX DOCD: CPT | Performed by: INTERNAL MEDICINE

## 2023-08-23 PROCEDURE — 36415 COLL VENOUS BLD VENIPUNCTURE: CPT

## 2023-08-23 PROCEDURE — 80061 LIPID PANEL: CPT

## 2023-08-23 PROCEDURE — 99396 PREV VISIT EST AGE 40-64: CPT | Performed by: INTERNAL MEDICINE

## 2023-08-23 PROCEDURE — 84443 ASSAY THYROID STIM HORMONE: CPT

## 2023-08-23 PROCEDURE — 3079F DIAST BP 80-89 MM HG: CPT | Performed by: INTERNAL MEDICINE

## 2023-08-23 PROCEDURE — 90677 PCV20 VACCINE IM: CPT | Performed by: INTERNAL MEDICINE

## 2023-08-23 PROCEDURE — 90715 TDAP VACCINE 7 YRS/> IM: CPT | Performed by: INTERNAL MEDICINE

## 2023-08-23 PROCEDURE — 3075F SYST BP GE 130 - 139MM HG: CPT | Performed by: INTERNAL MEDICINE

## 2023-08-23 PROCEDURE — 80053 COMPREHEN METABOLIC PANEL: CPT

## 2023-08-24 DIAGNOSIS — R97.20 PSA ELEVATION: Primary | ICD-10-CM

## 2023-08-28 ENCOUNTER — TELEPHONE (OUTPATIENT)
Dept: INTERNAL MEDICINE CLINIC | Facility: CLINIC | Age: 63
End: 2023-08-28

## 2023-08-28 NOTE — TELEPHONE ENCOUNTER
----- Message from Sanaz Trejo MD sent at 8/28/2023  8:26 AM CDT -----  Patient has not viewed [employer] result note. Please notify patient.

## 2023-09-29 RX ORDER — FEBUXOSTAT 40 MG/1
TABLET, FILM COATED ORAL
Qty: 90 TABLET | Refills: 3 | Status: SHIPPED | OUTPATIENT
Start: 2023-09-29

## 2023-09-29 NOTE — TELEPHONE ENCOUNTER
Refill request is for a maintenance medication and has met the criteria specified in the Ambulatory Medication Refill Standing Order for eligibility, visits, laboratory, alerts and was sent to the requested pharmacy.     Requested Prescriptions     Signed Prescriptions Disp Refills    FEBUXOSTAT 40 MG Oral Tab 90 tablet 3     Sig: TAKE 1 TABLET BY MOUTH EVERY DAY     Authorizing Provider: Yashira Brown     Ordering User: Ismael Emmanuel

## 2023-09-29 NOTE — TELEPHONE ENCOUNTER
[facility] left the following voicemail message -     Drug interaction - Febuxostat  Patient has heart disease -  medication can increase risk for cardiac event

## 2023-10-02 NOTE — TELEPHONE ENCOUNTER
[facility] [place] is calling back. Still waiting for a call back from our office. [facility] states the patient informed them of having \"cardiovascular disease\" back in July. This medication has an increased risk of heart attacks. Does Dr Jon Toro want to change the medication or should the pharmacy fill the medication and monitor the patient?

## 2023-10-04 ENCOUNTER — PATIENT MESSAGE (OUTPATIENT)
Dept: INTERNAL MEDICINE CLINIC | Facility: CLINIC | Age: 63
End: 2023-10-04

## 2023-10-13 ENCOUNTER — PATIENT MESSAGE (OUTPATIENT)
Dept: INTERNAL MEDICINE CLINIC | Facility: CLINIC | Age: 63
End: 2023-10-13

## 2023-10-13 NOTE — TELEPHONE ENCOUNTER
From: Leopoldo Clerk  To: Karol Guadarrama  Sent: 10/13/2023 12:48 PM CDT  Subject: Body on 8/23/23    When I visited you in August I had two shots administered. One was a tetanus shot. Was the other a flu shot or a pneumonia shot?

## 2023-10-25 DIAGNOSIS — E78.00 PURE HYPERCHOLESTEROLEMIA: ICD-10-CM

## 2023-10-25 DIAGNOSIS — I25.10 CORONARY ARTERY DISEASE INVOLVING NATIVE HEART WITHOUT ANGINA PECTORIS, UNSPECIFIED VESSEL OR LESION TYPE: ICD-10-CM

## 2023-10-25 RX ORDER — METOPROLOL SUCCINATE 50 MG/1
50 TABLET, EXTENDED RELEASE ORAL DAILY
Qty: 90 TABLET | Refills: 3 | Status: SHIPPED | OUTPATIENT
Start: 2023-10-25

## 2023-10-25 NOTE — TELEPHONE ENCOUNTER
Refill request is for a maintenance medication and has met the criteria specified in the Ambulatory Medication Refill Standing Order for eligibility, visits, laboratory, alerts and was sent to the requested pharmacy.     Requested Prescriptions     Signed Prescriptions Disp Refills    metoprolol succinate ER 50 MG Oral Tablet 24 Hr 90 tablet 3     Sig: TAKE 1 TABLET BY MOUTH EVERY DAY     Authorizing Provider: Wilder Apley D     Ordering User: Shanelle Porter

## 2024-01-06 ENCOUNTER — APPOINTMENT (OUTPATIENT)
Dept: CT IMAGING | Facility: HOSPITAL | Age: 64
End: 2024-01-06
Attending: EMERGENCY MEDICINE
Payer: COMMERCIAL

## 2024-01-06 ENCOUNTER — APPOINTMENT (OUTPATIENT)
Dept: GENERAL RADIOLOGY | Facility: HOSPITAL | Age: 64
End: 2024-01-06
Payer: COMMERCIAL

## 2024-01-06 ENCOUNTER — HOSPITAL ENCOUNTER (EMERGENCY)
Facility: HOSPITAL | Age: 64
Discharge: HOME | End: 2024-01-06
Attending: EMERGENCY MEDICINE
Payer: COMMERCIAL

## 2024-01-06 VITALS
SYSTOLIC BLOOD PRESSURE: 150 MMHG | RESPIRATION RATE: 15 BRPM | OXYGEN SATURATION: 96 % | WEIGHT: 215 LBS | HEART RATE: 63 BPM | TEMPERATURE: 98 F | BODY MASS INDEX: 34 KG/M2 | DIASTOLIC BLOOD PRESSURE: 97 MMHG

## 2024-01-06 DIAGNOSIS — R42 LIGHTHEADEDNESS: Primary | ICD-10-CM

## 2024-01-06 LAB
ANION GAP SERPL CALC-SCNC: 6 MMOL/L (ref 0–18)
BASOPHILS # BLD AUTO: 0.02 X10(3) UL (ref 0–0.2)
BASOPHILS NFR BLD AUTO: 0.4 %
BUN BLD-MCNC: 28 MG/DL (ref 9–23)
BUN/CREAT SERPL: 11.9 (ref 10–20)
CALCIUM BLD-MCNC: 9.6 MG/DL (ref 8.7–10.4)
CHLORIDE SERPL-SCNC: 111 MMOL/L (ref 98–112)
CO2 SERPL-SCNC: 26 MMOL/L (ref 21–32)
CREAT BLD-MCNC: 2.35 MG/DL
DEPRECATED RDW RBC AUTO: 45.1 FL (ref 35.1–46.3)
EGFRCR SERPLBLD CKD-EPI 2021: 31 ML/MIN/1.73M2 (ref 60–?)
EOSINOPHIL # BLD AUTO: 0.18 X10(3) UL (ref 0–0.7)
EOSINOPHIL NFR BLD AUTO: 3.7 %
ERYTHROCYTE [DISTWIDTH] IN BLOOD BY AUTOMATED COUNT: 13.1 % (ref 11–15)
GLUCOSE BLD-MCNC: 85 MG/DL (ref 70–99)
HCT VFR BLD AUTO: 44.5 %
HGB BLD-MCNC: 14.6 G/DL
IMM GRANULOCYTES # BLD AUTO: 0.02 X10(3) UL (ref 0–1)
IMM GRANULOCYTES NFR BLD: 0.4 %
LYMPHOCYTES # BLD AUTO: 1.4 X10(3) UL (ref 1–4)
LYMPHOCYTES NFR BLD AUTO: 28.9 %
MAGNESIUM SERPL-MCNC: 2.2 MG/DL (ref 1.6–2.6)
MCH RBC QN AUTO: 30.9 PG (ref 26–34)
MCHC RBC AUTO-ENTMCNC: 32.8 G/DL (ref 31–37)
MCV RBC AUTO: 94.1 FL
MONOCYTES # BLD AUTO: 0.49 X10(3) UL (ref 0.1–1)
MONOCYTES NFR BLD AUTO: 10.1 %
NEUTROPHILS # BLD AUTO: 2.74 X10 (3) UL (ref 1.5–7.7)
NEUTROPHILS # BLD AUTO: 2.74 X10(3) UL (ref 1.5–7.7)
NEUTROPHILS NFR BLD AUTO: 56.5 %
OSMOLALITY SERPL CALC.SUM OF ELEC: 301 MOSM/KG (ref 275–295)
PLATELET # BLD AUTO: 164 10(3)UL (ref 150–450)
POTASSIUM SERPL-SCNC: 4.8 MMOL/L (ref 3.5–5.1)
RBC # BLD AUTO: 4.73 X10(6)UL
SODIUM SERPL-SCNC: 143 MMOL/L (ref 136–145)
TROPONIN I SERPL HS-MCNC: 18 NG/L
TSI SER-ACNC: 2.45 MIU/ML (ref 0.55–4.78)
WBC # BLD AUTO: 4.9 X10(3) UL (ref 4–11)

## 2024-01-06 PROCEDURE — 85025 COMPLETE CBC W/AUTO DIFF WBC: CPT | Performed by: EMERGENCY MEDICINE

## 2024-01-06 PROCEDURE — 93005 ELECTROCARDIOGRAM TRACING: CPT

## 2024-01-06 PROCEDURE — 80048 BASIC METABOLIC PNL TOTAL CA: CPT | Performed by: EMERGENCY MEDICINE

## 2024-01-06 PROCEDURE — 70450 CT HEAD/BRAIN W/O DYE: CPT | Performed by: EMERGENCY MEDICINE

## 2024-01-06 PROCEDURE — 36415 COLL VENOUS BLD VENIPUNCTURE: CPT

## 2024-01-06 PROCEDURE — 83735 ASSAY OF MAGNESIUM: CPT | Performed by: EMERGENCY MEDICINE

## 2024-01-06 PROCEDURE — 84484 ASSAY OF TROPONIN QUANT: CPT | Performed by: EMERGENCY MEDICINE

## 2024-01-06 PROCEDURE — 84443 ASSAY THYROID STIM HORMONE: CPT | Performed by: EMERGENCY MEDICINE

## 2024-01-06 PROCEDURE — 71045 X-RAY EXAM CHEST 1 VIEW: CPT | Performed by: EMERGENCY MEDICINE

## 2024-01-06 PROCEDURE — 99284 EMERGENCY DEPT VISIT MOD MDM: CPT

## 2024-01-06 NOTE — DISCHARGE INSTRUCTIONS
Hold your metoprolol for the next three days and contact [facility] cardiology for followup/re-evaluation and further monitoring/management of your medications.

## 2024-01-06 NOTE — ED INITIAL ASSESSMENT (HPI)
Patient complains of heart rate of 47 today, read on fitbit, associated with dizziness. Also reports elevated bp, has been compliant with BP meds.

## 2024-01-06 NOTE — ED PROVIDER NOTES
Patient Seen in: [facility] Emergency Department    History     Chief Complaint   Patient presents with    Dizziness     Stated Complaint: HTN, low HR     HPI    63-year-old male with past medical history of CAD, hypertension, dyslipidemia, vertigo, CKD presenting for evaluation with complaints of lightheadedness as noted yesterday and with incidental reports of bradycardia as per smart watch with heart rates reported in 50s.  Symptoms associated with headache and dissimilar from prior vertigo described as lightheadedness vs typical positional dizziness/spinning. No photophobia/neck pain; no preceding traumatic/infectious complaints. No vision loss or focal weakness/paresthesias.  No chest pain or shortness of breath.  No vomiting or diarrhea.  No new medications or dosage changes EMR reviewed, prior outpatient PCP visit 8/23/2023 with HR of 70 though 6/27/2023 outpatient nephrology visit with HR of54.     Past Medical History:   Diagnosis Date    CAD (coronary artery disease)     Essential hypertension     Gout     Heart attack (HCC)     High cholesterol     History of 2019 novel coronavirus disease (COVID-19)     5/2020    HTN (hypertension) 1984    Hyperlipidemia     Proteinuria        Past Surgical History:   Procedure Laterality Date    ANGIOPLASTY (CORONARY)              Family History   Problem Relation Age of Onset    Hypertension Mother     Diabetes Mother     Stroke Mother     Hypertension Sister     Hypertension Brother        Social History     Socioeconomic History    Marital status:    Tobacco Use    Smoking status: Former     Types: Cigarettes    Smokeless tobacco: Never   Vaping Use    Vaping Use: Never used   Substance and Sexual Activity    Alcohol use: Yes     Alcohol/week: 4.0 standard drinks of alcohol     Types: 4 Cans of beer per week     Comment: in 2 weeks    Drug use: No       Review of Systems :  Constitutional: As per HPI  Eyes: Negative for discharge and visual disturbance.    Respiratory: Negative for cough and shortness of breath.    Cardiovascular: Negative for chest pain and palpitations.   Neurological: Negative for syncope and headaches. (+) lightheadedness.    Positive for stated complaint: HTN, low HR  Other systems are as noted in HPI.  Constitutional and vital signs reviewed.      All other systems reviewed and negative except as noted above.    PSFH elements reviewed from today and agreed except as otherwise stated in HPI.    Physical Exam     ED Triage Vitals [01/06/24 1455]   BP (!) 170/91   Pulse 59   Resp 18   Temp 97.5 °F (36.4 °C)   Temp src Temporal   SpO2 97 %   O2 Device None (Room air)       Current:BP (!) 170/91   Pulse 59   Temp 97.5 °F (36.4 °C) (Temporal)   Resp 18   Wt 97.5 kg   SpO2 97%   BMI 33.67 kg/m²         Physical Exam   Constitutional: No distress.   HEENT: MMM.  Head: Normocephalic. Atraumatic.  Eyes: No injection. Pupils midrange and equally reactive. No photophobia.  Cardiovascular: Regular rhythm.  Pulmonary/Chest: Effort normal. CTAB.  Abdominal: Soft. Nontender.  Musculoskeletal: No gross deformity.  Neurological: Alert. CN II-XII grossly intact. BUE/BLE proximally and distally with 5/5 strength.  Skin: Skin is warm.   Psychiatric: Cooperative.  Nursing note and vitals reviewed.        ED Course     Labs Reviewed   BASIC METABOLIC PANEL (8) - Abnormal; Notable for the following components:       Result Value    BUN 28 (*)     Creatinine 2.35 (*)     Calculated Osmolality 301 (*)     eGFR-Cr 31 (*)     All other components within normal limits   TROPONIN I HIGH SENSITIVITY - Normal   MAGNESIUM - Normal   TSH W REFLEX TO FREE T4 - Normal   CBC WITH DIFFERENTIAL WITH PLATELET    Narrative:     The following orders were created for panel order CBC With Differential With Platelet.  Procedure                               Abnormality         Status                     ---------                               -----------         ------                      CBC W/ DIFFERENTIAL[[id number]]                              Final result                 Please view results for these tests on the individual orders.   RAINBOW DRAW BLUE   RAINBOW DRAW GOLD   CBC W/ DIFFERENTIAL     CT BRAIN OR HEAD (32291)    Result Date: 1/6/2024  PROCEDURE: CT BRAIN OR HEAD (CPT=70450)  COMPARISON: [facility], MRI BRAIN (CPT=70551), 1/15/2020, 10:17 AM.  INDICATIONS: HTN, low HR  TECHNIQUE: CT images were obtained without contrast material.  Automated exposure control for dose reduction was used.  Dose information is transmitted to the ACR (American College of Radiology) NRDR (National Radiology Data Registry) which includes the Dose Index Registry.  FINDINGS:  CSF SPACES: The ventricles, cisterns, and sulci are commensurate in caliber and appropriate for age. No hydrocephalus, subarachnoid hemorrhage, or effacement of the basal cisterns is appreciated. There is no extra-axial fluid collection. CEREBRUM: No acute intraparenchymal hemorrhage, edema, or cortical sulcal effacement is apparent. There is no space-occupying lesion, mass effect, or shift of midline structures. The gray-white matter junction is preserved and bilaterally symmetric in appearance.  Mild bilateral white matter decreased attenuation probably chronic microvascular white matter ischemia. CEREBELLUM: No edema, hemorrhage, mass, acute infarction, or significant atrophy.  BRAINSTEM: No edema, hemorrhage, mass, acute infarction, or significant atrophy.  CALVARIUM: There is no apparent depressed fracture, mass, or other significant visible lesion.  SINUSES: Limited views demonstrate no significant mucosal thickening or fluid.  ORBITS: Limited views are grossly unremarkable.  OTHER: Negative.          CONCLUSION: No acute intracranial abnormality.    Dictated by (CST): Nikolay Ferguson MD on 1/06/2024 at 4:25 PM     Finalized by (CST): Nikolay Ferguson MD on 1/06/2024 at 4:27 PM          XR CHEST AP PORTABLE   (CPT=71045)    Result Date: 1/6/2024  PROCEDURE: XR CHEST AP PORTABLE  (CPT=71045) TIME: 1544 hours.   COMPARISON: [facility], XR CHEST PA + LAT CHEST (ICP=07325), 7/29/2017, 3:30 PM.  INDICATIONS: Essential hypertension, bradycardia, dizziness.  TECHNIQUE:   Single view.   FINDINGS:  CARDIAC/VASC: Borderline cardiomegaly.  Mildly tortuous thoracic aorta.  No vascular congestion. MEDIAST/JESSICA:   No visible mass or adenopathy. LUNGS/PLEURA: No focal opacity.  No pleural effusion.  No pneumothorax. BONES: Degenerative change thoracic spine. OTHER: Negative.          CONCLUSION: No acute cardiopulmonary disease.    Dictated by (CST): Nikolay Ferguson MD on 1/06/2024 at 4:11 PM     Finalized by (CST): Nikolay Ferguson MD on 1/06/2024 at 4:11 PM           EKG    Rate, intervals and axes as noted on EKG Report.  Rate: 50  Rhythm: Sinus Rhythm  Reading: sinus jon 50 withotu ARELI as independently interpretd by myself           ED Course as of 01/06/24 2176  ------------------------------------------------------------  Time: 01/06 1705  Comment: ED course ,nonacute patient resting comfortably without symptom recurrence. Case d/w [facility] cardiology Dr. Munoz (former patient of Dr Cowan), in agreement with empiric hold of metoprolol and ongoing monitoring of symptoms/hemodynamics with close/ongoing outpatient followup.       MDM   DIFFERENTIAL DIAGNOSIS: After history and physical exam differential diagnosis includes but is not limited to electrolyte/thyroid derangement, ACS, myocarditis, intracerebral hemorrhage/mass, anemia, dysrhythmia    Pulse ox: 97%:Normal on RA, as interpreted by myself    Cardiac Monitor Interpretation:   Pulse Readings from Last 1 Encounters:   01/06/24 59   , sinus,      Medical Decision Making  Evaluation for lightheadedness associated with now resolved headache in neurologically intact patient without meningismus and with reported bradycardia in patient on metoprolol and with  prior outpatient records from 6/2023 demonstrating similar. Labs/imaging nonacute, ED course without symptom recurrence  - case d/w [facility] cardiology Dr. Munoz and in agreement with empiric holding of metoprolol and monitoring of symptoms/hemodynamics with close/ongoing outpatient followup; patient/family advised of same and comfortable with discharge plan of care.    Problems Addressed:  Lightheadedness: acute illness or injury    Amount and/or Complexity of Data Reviewed  External Data Reviewed: labs and notes.     Details: 6/27 and 8/23 outpatient notes,12/13/2023 renal function panel reviewed  Labs: ordered. Decision-making details documented in ED Course.  Radiology: ordered and independent interpretation performed. Decision-making details documented in ED Course.     Details: CXR without obvious pneumothorax as independently interpreted by myself  ECG/medicine tests: ordered and independent interpretation performed. Decision-making details documented in ED Course.  Discussion of management or test interpretation with external provider(s): Case d/w cardiology Dr. Munoz    Risk  Prescription drug management.  Risk Details: Empiric/trial of holding of metoprolol discussed with cardiology Dr. Munoz and patient      I was wearing at minimum a facemask and eye protection throughout this encounter with handwashing performed prior and after patient evaluation without personal hand/facial/oropharyngeal contact and gloves worn throughout encounter. See note and/or contact this provider for further PPE details.    We recommend that you schedule follow up care with a primary care provider within the next three months to obtain basic health screening including reassessment of your blood pressure.      You had elevated blood pressure today and you need to follow up with your doctor for a repeat blood pressure check and further discussion of lifestyle modifications that include Weight Reduction - Dietary Sodium Restriction  - Increased Physical Activity and Moderation in alcohol (ETOH) Consumption. If possible check your pressure at home and keep a blood pressure log to bring to your physician.    Disposition and Plan     Clinical Impression:  1. Lightheadedness        Disposition:  Discharge    Follow-up:  Manjit Munoz MD  [address]  [facility] [address]  [phone]    Call  For cardiology followup and re-evalaution.    Rani Hansen MD  [address] [place] [id number] [phone]    Call  As needed for followup and re-evaluation.      Medications Prescribed:  Discharge Medication List as of 1/6/2024  5:14 PM

## 2024-01-07 LAB
ATRIAL RATE: 50 BPM
P AXIS: 67 DEGREES
P-R INTERVAL: 172 MS
Q-T INTERVAL: 438 MS
QRS DURATION: 84 MS
QTC CALCULATION (BEZET): 399 MS
R AXIS: 34 DEGREES
T AXIS: 58 DEGREES
VENTRICULAR RATE: 50 BPM

## 2024-11-15 ENCOUNTER — MED REC SCAN ONLY (OUTPATIENT)
Dept: INTERNAL MEDICINE CLINIC | Facility: CLINIC | Age: 64
End: 2024-11-15

## (undated) NOTE — LETTER
To Whom It May Concern: This certifies that José Miguel Power is under my medical care. Please excuse Jyothi Diaz from work until further notice during COVID-19 outbreak. Patient is part of a high risk group due to age and multiple co-morbidities.  Do not hesitat

## (undated) NOTE — LETTER
[facility], TEENA Carmichael  [address] [facility] [id number] [phone]            4/16/20      To Whom It May Concern:      Betsey Linder is currently under my medical care and is now medically cleared to return to

## (undated) NOTE — LETTER
Dear Employer,  At [facility], we are taking special precautions and doing everything we can to prevent the spread of COVID-19 (coronavirus disease 2019).  During this time, we ask for your assistance regarding physician documentation for empl

## (undated) NOTE — ED AVS SNAPSHOT
Trini Barnard   MRN: [id number]    Department:  [facility] Emergency Department   Date of Visit:  1/2/2020           Disclosure     Insurance plans vary and the physician(s) referred by the ER may not be covered by your plan.  Please contact your CARE PHYSICIAN AT ONCE OR RETURN IMMEDIATELY TO THE EMERGENCY DEPARTMENT. If you have been prescribed any medication(s), please fill your prescription right away and begin taking the medication(s) as directed.   If you believe that any of the medications

## (undated) NOTE — LETTER
October 30, 2019    Reina Peak  [address] [facility]      Dear Alena Closs: The following are the results of your recent tests ordered by [facility]. Please review the list of test results.   Your result is the number on the left; we h to [email or web address] and click \"Sign up Now\". [employer] Activation Code: [email or web address]  Expires: 12/29/2019 12:16 PM         Follow the on-screen instructions to complete your registration.     Sincerely Yours,  Marshall Fink -

## (undated) NOTE — ED AVS SNAPSHOT
Judie Sánchez   MRN: [id number]    Department:  [facility] Emergency Department   Date of Visit:  7/29/2017           Disclosure     Insurance plans vary and the physician(s) referred by the ER may not be covered by your plan.  Please contact you CARE PHYSICIAN AT ONCE OR RETURN IMMEDIATELY TO THE EMERGENCY DEPARTMENT. If you have been prescribed any medication(s), please fill your prescription right away and begin taking the medication(s) as directed.   If you believe that any of the medications